# Patient Record
Sex: FEMALE | Race: WHITE
[De-identification: names, ages, dates, MRNs, and addresses within clinical notes are randomized per-mention and may not be internally consistent; named-entity substitution may affect disease eponyms.]

---

## 2017-01-10 ENCOUNTER — HOSPITAL ENCOUNTER (EMERGENCY)
Dept: HOSPITAL 17 - NEPJ | Age: 27
LOS: 1 days | Discharge: HOME | End: 2017-01-11
Payer: COMMERCIAL

## 2017-01-10 VITALS
OXYGEN SATURATION: 98 % | SYSTOLIC BLOOD PRESSURE: 156 MMHG | RESPIRATION RATE: 17 BRPM | DIASTOLIC BLOOD PRESSURE: 96 MMHG | TEMPERATURE: 98.3 F | HEART RATE: 74 BPM

## 2017-01-10 VITALS
DIASTOLIC BLOOD PRESSURE: 96 MMHG | RESPIRATION RATE: 18 BRPM | SYSTOLIC BLOOD PRESSURE: 160 MMHG | OXYGEN SATURATION: 98 % | HEART RATE: 80 BPM

## 2017-01-10 VITALS — HEIGHT: 64 IN | WEIGHT: 110.23 LBS | BODY MASS INDEX: 18.82 KG/M2

## 2017-01-10 VITALS
HEART RATE: 81 BPM | OXYGEN SATURATION: 98 % | DIASTOLIC BLOOD PRESSURE: 56 MMHG | RESPIRATION RATE: 17 BRPM | SYSTOLIC BLOOD PRESSURE: 116 MMHG

## 2017-01-10 DIAGNOSIS — F34.81: Primary | ICD-10-CM

## 2017-01-10 LAB
ALP SERPL-CCNC: 80 U/L (ref 45–117)
ALT SERPL-CCNC: 21 U/L (ref 10–53)
AMPHETAMINE, URINE: (no result)
ANION GAP SERPL CALC-SCNC: 8 MEQ/L (ref 5–15)
AST SERPL-CCNC: 12 U/L (ref 15–37)
BARBITURATES, URINE: (no result)
BASOPHILS # BLD AUTO: 0.1 TH/MM3 (ref 0–0.2)
BASOPHILS NFR BLD: 0.7 % (ref 0–2)
BILIRUB SERPL-MCNC: 0.3 MG/DL (ref 0.2–1)
BUN SERPL-MCNC: 10 MG/DL (ref 7–18)
CHLORIDE SERPL-SCNC: 106 MEQ/L (ref 98–107)
COCAINE UR-MCNC: (no result) NG/ML
EOSINOPHIL # BLD: 0.1 TH/MM3 (ref 0–0.4)
EOSINOPHIL NFR BLD: 1 % (ref 0–4)
ERYTHROCYTE [DISTWIDTH] IN BLOOD BY AUTOMATED COUNT: 12.8 % (ref 11.6–17.2)
GFR SERPLBLD BASED ON 1.73 SQ M-ARVRAT: 71 ML/MIN (ref 89–?)
HCO3 BLD-SCNC: 27 MEQ/L (ref 21–32)
HCT VFR BLD CALC: 42 % (ref 35–46)
HEMO FLAGS: (no result)
LYMPHOCYTES # BLD AUTO: 1.9 TH/MM3 (ref 1–4.8)
LYMPHOCYTES NFR BLD AUTO: 20 % (ref 9–44)
MCH RBC QN AUTO: 30.5 PG (ref 27–34)
MCHC RBC AUTO-ENTMCNC: 33.5 % (ref 32–36)
MCV RBC AUTO: 91.3 FL (ref 80–100)
MONOCYTES NFR BLD: 4.9 % (ref 0–8)
NEUTROPHILS # BLD AUTO: 6.8 TH/MM3 (ref 1.8–7.7)
NEUTROPHILS NFR BLD AUTO: 73.4 % (ref 16–70)
PLATELET # BLD: 357 TH/MM3 (ref 150–450)
POTASSIUM SERPL-SCNC: 3.7 MEQ/L (ref 3.5–5.1)
RBC # BLD AUTO: 4.6 MIL/MM3 (ref 4–5.3)
SODIUM SERPL-SCNC: 141 MEQ/L (ref 136–145)
WBC # BLD AUTO: 9.3 TH/MM3 (ref 4–11)

## 2017-01-10 PROCEDURE — 80053 COMPREHEN METABOLIC PANEL: CPT

## 2017-01-10 PROCEDURE — 80320 DRUG SCREEN QUANTALCOHOLS: CPT

## 2017-01-10 PROCEDURE — 85025 COMPLETE CBC W/AUTO DIFF WBC: CPT

## 2017-01-10 PROCEDURE — 99283 EMERGENCY DEPT VISIT LOW MDM: CPT

## 2017-01-10 PROCEDURE — 80307 DRUG TEST PRSMV CHEM ANLYZR: CPT

## 2017-01-10 NOTE — PD
HPI


Chief Complaint:  Psychiatric Symptoms


Time Seen by Provider:  18:45


Travel History


International Travel<30 days:  No


Contact w/Intl Traveler<30days:  No


Traveled to known affect area:  No





History of Present Illness


HPI


Patient is a 26-year-old female who presents emergency Department under Sweet 

act for suicidal homicidal ideations.  Patient states that she's been hearing 

voices that are telling her to kill herself in her housemates.  She states this 

makes her scared and nervous.  She wants to get help so she doesn't do these 

things.  She states that the voices also tell her to hit her head on the floor.

  She denies any physical complaints at this time.  She denies any previous 

suicide attempt, she denies any formal psychiatric diagnosis in the past.





Formerly [facility], 1928–1983


Past Medical History


Anxiety:  Yes


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


Developmental Delay:  Yes


Diminished Hearing:  No


Endocrine:  No


Genitourinary:  No


Immune Disorder:  No


Musculoskeletal:  No


Neurologic:  Yes (right hand contracture)


Reproductive:  No


Respiratory:  No


Immunizations Current:  Yes


Migraines:  No


Pancreatitis:  Yes


Schizophrenia:  Yes (HEARING HALLUCINATION SINCE 10 YEARS OLD )


Pregnant?:  Unknown





Past Surgical History


Cholecystectomy:  Yes


Thoracic Surgery:  Yes (G-TUBE INSERTION AND REMOVAL)


Other Surgery:  Yes





Social History


Alcohol Use:  No


Tobacco Use:  No


Substance Use:  No





Allergies-Medications


(Allergen,Severity, Reaction):  


Coded Allergies:  


     Seafood (Verified  Allergy, Severe, Anaphylaxis, 12/3/16)


Reported Meds & Prescriptions





Reported Meds & Active Scripts


Active


Zyprexa Zydis (Olanzapine) 20 Mg Tab 20 Mg SL HS


Lithium Carbonate 300 Mg Cap 300 Mg PO 3 HS


Zyprexa (Olanzapine) 20 Mg Tab 20 Mg PO HS


Ativan (Lorazepam) 0.5 Mg Tab 0.5 Mg PO BID


Lamictal (Lamotrigine) 200 Mg Tab 200 Mg PO BID


Levothyroxine (Levothyroxine Sodium) 88 Mcg Tab 88 Mcg PO DAILY


Poly-Iron 150 (Polysaccharide Iron Complex) 150 Mg Cap 150 Mg PO DAILY


B-12 (Cyanocobalamin) 1,000 Mcg Lozg 1,000 Mcg IM Q30 DAYS


Polyethylene Glycol 3350 (Polyethylene Glycol 3350 (Bulk) 1 Gra Gra 3,350 Meq 

PO DAILY 30 Days








Review of Systems


Except as stated in HPI:  all other systems reviewed are Neg


Neurologic:  Positive: Other (fracture right upper extremity/ hand)


Psychiatric:  Positive: Anxiety, Suicidal Ideations, Mood Disorder, Homicidal 

Ideation





Physical Exam


Narrative


GENERAL: Well-developed, well-nourished, alert  female.  Resting 

comfortably in no acute distress.


SKIN: Warm and dry.


HEAD: Atraumatic. Normocephalic. 


EYES: Pupils equal and round. No scleral icterus. No injection or drainage. 


ENT: No nasal bleeding or discharge.  Mucous membranes pink and moist.


NECK: Trachea midline. No JVD. 


CARDIOVASCULAR: Regular rate and rhythm.  No murmur appreciated.


RESPIRATORY: No accessory muscle use. Clear to auscultation. Breath sounds 

equal bilaterally. 


GASTROINTESTINAL: Abdomen soft, non-tender, nondistended. Hepatic and splenic 

margins not palpable. 


MUSCULOSKELETAL: No obvious deformities. No clubbing.  No cyanosis.  No edema. 


NEUROLOGICAL: Awake and alert. No obvious cranial nerve deficits.  Motor 

grossly within normal limits. Normal speech.  Right upper extremity 

contracture.  Slight right facial drooping


PSYCHIATRIC: Appropriate mood and affect; insight and judgment normal.





Data


Data


Last Documented VS





Vital Signs








  Date Time  Temp Pulse Resp B/P Pulse Ox O2 Delivery O2 Flow Rate FiO2


 


1/10/17 16:09 98.3 74 17 156/96 98   








Orders





 Complete Blood Count With Diff (1/10/17 16:38)


Comprehensive Metabolic Panel (1/10/17 16:38)


Drug Screen, Random Urine (1/10/17 16:38)


Alcohol (Ethanol) (1/10/17 16:38)


Psych Screen (1/10/17 16:38)


Diet Regular Basic (1/10/17 Dinner)





Labs





 Laboratory Tests








Test 1/10/17 1/10/17





 16:37 18:20


 


White Blood Count 9.3 TH/MM3 


 


Red Blood Count 4.60 MIL/MM3 


 


Hemoglobin 14.1 GM/DL 


 


Hematocrit 42.0 % 


 


Mean Corpuscular Volume 91.3 FL 


 


Mean Corpuscular Hemoglobin 30.5 PG 


 


Mean Corpuscular Hemoglobin 33.5 % 





Concent  


 


Red Cell Distribution Width 12.8 % 


 


Platelet Count 357 TH/MM3 


 


Mean Platelet Volume 7.0 FL 


 


Neutrophils (%) (Auto) 73.4 % 


 


Lymphocytes (%) (Auto) 20.0 % 


 


Monocytes (%) (Auto) 4.9 % 


 


Eosinophils (%) (Auto) 1.0 % 


 


Basophils (%) (Auto) 0.7 % 


 


Neutrophils # (Auto) 6.8 TH/MM3 


 


Lymphocytes # (Auto) 1.9 TH/MM3 


 


Monocytes # (Auto) 0.5 TH/MM3 


 


Eosinophils # (Auto) 0.1 TH/MM3 


 


Basophils # (Auto) 0.1 TH/MM3 


 


CBC Comment DIFF FINAL  


 


Differential Comment   


 


Sodium Level 141 MEQ/L 


 


Potassium Level 3.7 MEQ/L 


 


Chloride Level 106 MEQ/L 


 


Carbon Dioxide Level 27.0 MEQ/L 


 


Anion Gap 8 MEQ/L 


 


Blood Urea Nitrogen 10 MG/DL 


 


Creatinine 0.96 MG/DL 


 


Estimat Glomerular Filtration 71 ML/MIN 





Rate  


 


Random Glucose 127 MG/DL 


 


Calcium Level 9.4 MG/DL 


 


Total Bilirubin 0.3 MG/DL 


 


Aspartate Amino Transf 12 U/L 





(AST/SGOT)  


 


Alanine Aminotransferase 21 U/L 





(ALT/SGPT)  


 


Alkaline Phosphatase 80 U/L 


 


Total Protein 7.5 GM/DL 


 


Albumin 3.7 GM/DL 


 


Ethyl Alcohol Level LESS THAN 3 





 MG/DL 


 


Urine Opiates Screen  NEG 


 


Urine Barbiturates Screen  NEG 


 


Urine Amphetamines Screen  NEG 


 


Urine Benzodiazepines Screen  NEG 


 


Urine Cocaine Screen  NEG 


 


Urine Cannabinoids Screen  NEG 











MDM


Medical Decision Making


Medical Screen Exam Complete:  Yes


Emergency Medical Condition:  Yes


Interpretation(s)





Vital Signs








  Date Time  Temp Pulse Resp B/P Pulse Ox O2 Delivery O2 Flow Rate FiO2


 


1/10/17 16:09 98.3 74 17 156/96 98   








Differential Diagnosis


Mood disorder versus substance abuse versus schizophrenia versus delirium 

versus psychosis versus other


Narrative Course


Patient's 26-year-old female who presents emergency Department under Sweet act 

for suicidal and homicidal ideations.  Patient appears nervous that she is 

actually going to follow through with these thoughts.  Patient states that the 

voices in her head are telling her to do these things.  She has no other 

complaints at this time.  CBC, chemistry, tox screen are unremarkable.  Patient'

s vital signs are stable.


Patient is medically cleared for psychiatric evaluation at this time.





Diagnosis





 Primary Impression:  


 Medical clearance for psychiatric admission


 Additional Impressions:  


 Suicidal ideations


 Homicidal ideation


Condition:  Stable








Hafsa Diggs Madhav 10, 2017 19:06

## 2017-01-11 ENCOUNTER — HOSPITAL ENCOUNTER (EMERGENCY)
Dept: HOSPITAL 17 - NEPA | Age: 27
LOS: 1 days | Discharge: HOME | End: 2017-01-12
Payer: COMMERCIAL

## 2017-01-11 VITALS — HEIGHT: 61 IN | BODY MASS INDEX: 22.89 KG/M2 | WEIGHT: 121.25 LBS

## 2017-01-11 VITALS
DIASTOLIC BLOOD PRESSURE: 88 MMHG | HEART RATE: 87 BPM | OXYGEN SATURATION: 97 % | RESPIRATION RATE: 18 BRPM | SYSTOLIC BLOOD PRESSURE: 143 MMHG

## 2017-01-11 VITALS
SYSTOLIC BLOOD PRESSURE: 142 MMHG | DIASTOLIC BLOOD PRESSURE: 83 MMHG | OXYGEN SATURATION: 99 % | RESPIRATION RATE: 18 BRPM | HEART RATE: 78 BPM

## 2017-01-11 VITALS
HEART RATE: 61 BPM | RESPIRATION RATE: 17 BRPM | OXYGEN SATURATION: 97 % | SYSTOLIC BLOOD PRESSURE: 107 MMHG | DIASTOLIC BLOOD PRESSURE: 61 MMHG

## 2017-01-11 VITALS
HEART RATE: 70 BPM | RESPIRATION RATE: 16 BRPM | OXYGEN SATURATION: 100 % | SYSTOLIC BLOOD PRESSURE: 154 MMHG | DIASTOLIC BLOOD PRESSURE: 79 MMHG | TEMPERATURE: 98.2 F

## 2017-01-11 VITALS
OXYGEN SATURATION: 100 % | DIASTOLIC BLOOD PRESSURE: 65 MMHG | HEART RATE: 52 BPM | RESPIRATION RATE: 17 BRPM | SYSTOLIC BLOOD PRESSURE: 129 MMHG

## 2017-01-11 DIAGNOSIS — Z86.73: ICD-10-CM

## 2017-01-11 DIAGNOSIS — F20.9: ICD-10-CM

## 2017-01-11 DIAGNOSIS — F43.20: Primary | ICD-10-CM

## 2017-01-11 LAB
ALP SERPL-CCNC: 75 U/L (ref 45–117)
ALT SERPL-CCNC: 20 U/L (ref 10–53)
AMPHETAMINE, URINE: (no result)
ANION GAP SERPL CALC-SCNC: 8 MEQ/L (ref 5–15)
AST SERPL-CCNC: 12 U/L (ref 15–37)
BARBITURATES, URINE: (no result)
BASOPHILS # BLD AUTO: 0.1 TH/MM3 (ref 0–0.2)
BASOPHILS NFR BLD: 0.8 % (ref 0–2)
BILIRUB SERPL-MCNC: 0.3 MG/DL (ref 0.2–1)
BUN SERPL-MCNC: 9 MG/DL (ref 7–18)
CHLORIDE SERPL-SCNC: 106 MEQ/L (ref 98–107)
COCAINE UR-MCNC: (no result) NG/ML
EOSINOPHIL # BLD: 0.1 TH/MM3 (ref 0–0.4)
EOSINOPHIL NFR BLD: 1 % (ref 0–4)
ERYTHROCYTE [DISTWIDTH] IN BLOOD BY AUTOMATED COUNT: 12.6 % (ref 11.6–17.2)
GFR SERPLBLD BASED ON 1.73 SQ M-ARVRAT: 83 ML/MIN (ref 89–?)
HCO3 BLD-SCNC: 27 MEQ/L (ref 21–32)
HCT VFR BLD CALC: 40.5 % (ref 35–46)
HEMO FLAGS: (no result)
LYMPHOCYTES # BLD AUTO: 2.4 TH/MM3 (ref 1–4.8)
LYMPHOCYTES NFR BLD AUTO: 23.9 % (ref 9–44)
MCH RBC QN AUTO: 30.6 PG (ref 27–34)
MCHC RBC AUTO-ENTMCNC: 34 % (ref 32–36)
MCV RBC AUTO: 89.9 FL (ref 80–100)
MONOCYTES NFR BLD: 7 % (ref 0–8)
NEUTROPHILS # BLD AUTO: 6.8 TH/MM3 (ref 1.8–7.7)
NEUTROPHILS NFR BLD AUTO: 67.3 % (ref 16–70)
PLATELET # BLD: 395 TH/MM3 (ref 150–450)
POTASSIUM SERPL-SCNC: 3.6 MEQ/L (ref 3.5–5.1)
RBC # BLD AUTO: 4.51 MIL/MM3 (ref 4–5.3)
SODIUM SERPL-SCNC: 141 MEQ/L (ref 136–145)
WBC # BLD AUTO: 10 TH/MM3 (ref 4–11)

## 2017-01-11 PROCEDURE — 80320 DRUG SCREEN QUANTALCOHOLS: CPT

## 2017-01-11 PROCEDURE — 99283 EMERGENCY DEPT VISIT LOW MDM: CPT

## 2017-01-11 PROCEDURE — 85025 COMPLETE CBC W/AUTO DIFF WBC: CPT

## 2017-01-11 PROCEDURE — 84703 CHORIONIC GONADOTROPIN ASSAY: CPT

## 2017-01-11 PROCEDURE — 80053 COMPREHEN METABOLIC PANEL: CPT

## 2017-01-11 PROCEDURE — 80307 DRUG TEST PRSMV CHEM ANLYZR: CPT

## 2017-01-11 NOTE — PD
HPI


Chief Complaint:  psych


Time Seen by Provider:  20:45


Travel History


International Travel<30 days:  No


Contact w/Intl Traveler<30days:  No





History of Present Illness


HPI


26-year-old female with a history of schizophrenia presents to the emergency 

Department under Sweet act for suicidal and homicidal ideations.  The patient 

states that she has recently begun to hear voices in her head that tell her to 

harm herself and to harm the other people at her group home.  She denies any 

attempts to harm herself, denies any ingestion of substances.  She complains of 

mild headache.  She denies any fever, chills, nausea, vomiting, chest pain, 

shortness breath, abdominal pain, lightheadedness, dizziness.  Denies alcohol 

or drug use.  Denies pregnancy.  No other complaints.





PFSH


Past Medical History


Anxiety:  Yes


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


Developmental Delay:  Yes


Diminished Hearing:  No


Endocrine:  No


Genitourinary:  No


Immune Disorder:  No


Musculoskeletal:  No


Neurologic:  Yes (right hand contracture)


Reproductive:  No


Respiratory:  No


Immunizations Current:  Yes


Migraines:  No


Pancreatitis:  Yes


Schizophrenia:  Yes (HEARING HALLUCINATION SINCE 10 YEARS OLD )





Past Surgical History


Cholecystectomy:  Yes


Thoracic Surgery:  Yes (G-TUBE INSERTION AND REMOVAL)


Other Surgery:  Yes





Social History


Alcohol Use:  No


Tobacco Use:  No


Substance Use:  No





Allergies-Medications


(Allergen,Severity, Reaction):  


Coded Allergies:  


     Seafood (Verified  Allergy, Severe, Anaphylaxis, 12/3/16)


Reported Meds & Prescriptions





Reported Meds & Active Scripts


Active


Zyprexa Zydis (Olanzapine) 20 Mg Tab 20 Mg SL HS


Lithium Carbonate 300 Mg Cap 300 Mg PO 3 HS


Ativan (Lorazepam) 0.5 Mg Tab 0.5 Mg PO BID


Lamictal (Lamotrigine) 200 Mg Tab 200 Mg PO BID


Levothyroxine (Levothyroxine Sodium) 88 Mcg Tab 88 Mcg PO DAILY


Poly-Iron 150 (Polysaccharide Iron Complex) 150 Mg Cap 150 Mg PO DAILY


B-12 (Cyanocobalamin) 1,000 Mcg Lozg 1,000 Mcg IM Q30 DAYS


Polyethylene Glycol 3350 (Polyethylene Glycol 3350 (Bulk) 1 Gra Gra 3,350 Meq 

PO DAILY 30 Days


Reported


D 1000 (Cholecalciferol) 1,000 Unit Cap   


[place] (Levonorgestrel-Ethinyl Estradiol) 0.15-0.03-0.01 Mg Tab 1 Tab PO DAILY


Prevident 5000 Sensitive 1.1-5 % (Sodium Fluoride-Potassium Nitr) 1 Pst Pst 

Unknown Dose PO HS


Clindamycin Topical (Clindamycin Phosphate) 1% Gel 1 Applic TOPICAL BID








Review of Systems


Except as stated in HPI:  all other systems reviewed are Neg





Physical Exam


Narrative


GENERAL: Well-nourished and well-developed pleasant female patient in no acute 

distress.


SKIN: Warm and dry.


HEAD: Normocephalic and atraumatic. 


EYES: No injection, drainage, or hyphema noted.  PERRLA.  EOMI.  


ENT: No nasal drainage noted.  Oropharynx is clear.


NECK: Supple and the trachea is midline. 


CARDIOVASCULAR: Regular rate and rhythm.


RESPIRATORY: Breath sounds are equal bilaterally with no accessory muscle use, 

wheezing, rhonchi, or crackles.


GASTROINTESTINAL: Abdomen is soft, non-tender, and nondistended.  


MUSCULOSKELETAL: No obvious deformities, swelling, cyanosis, or ecchymosis is 

present throughout the upper and lower extremities.  Patient has full range of 

motion without any signs of neurovascular compromise.  


NEUROLOGICAL: Awake, alert, and oriented. Normal speech and gait. Cranial 

nerves are grossly intact.





Data


Data


Orders





 Complete Blood Count With Diff (1/11/17 20:44)


Comprehensive Metabolic Panel (1/11/17 20:44)


Drug Screen, Random Urine (1/11/17 20:44)


Ed Urine Pregnancytest Poc (1/11/17 20:44)


Alcohol (Ethanol) (1/11/17 20:44)


Psych Screen (1/11/17 20:44)








MDM


Medical Decision Making


Medical Screen Exam Complete:  Yes


Emergency Medical Condition:  Yes


Differential Diagnosis


Differential: Depression versus adjustment reaction versus anxiety versus PTSD 

versus psychosis NOS versus mood disorder NOS versus substance induced mood 

disorder versus ODD versus adjustment reaction versus schizophrenia versus 

bipolar disorder versus schizoaffective versus electrolyte abnormality versus 

dementia versus malingering.


Narrative Course


Patient presents under a Sweet act.  Physical examination and vital signs are 

essentially unremarkable.  Patient has no medical complaints to report.





Psych screen has been ordered.





If the laboratory results are unremarkable, the patient will be medically 

cleared for psychiatric evaluation and disposition.





Diagnosis





 Primary Impression:  


 Schizophrenia, paranoid type








Li Brewster Jan 11, 2017 20:47

## 2017-01-11 NOTE — PD
__________________________________________________





History of Present Illness


Chief Complaint:  Psychiatric Symptoms


Time Seen by Provider:  14:15


Travel History


International Travel<30 Days:  No


Contact w/Intl Traveler<30days:  No


Known affected area:  No





Legal Status


Legal Status:  B[place] Act


[place] Act Signed By:  Karin Riggins


History of Present Illness:


History of Present Illness





Patient is a 26-year-old female with hx of disruptive mood regulation as well 

as mood disorder  who presents emergency Department under Sweet act initiated 

by NAAM . As per the report the patient reported hearing voices that are telling 

her to kill herself. She told the  that she was banging her head 

against a dresser. Patient has had previous visits to ED for similar behaviors. 

She currently lives in a group home and is unhappy because she is going to be 

moving to another home soon. 


Patient was monitored in J pod. She did not present any behavioral concerns. 

there was no agitation and no  self harming behaviors. She did not present any 

psychosis.She is alert and oriented. Engages easily with this writer and 

frequently states " I'm not fibbing". She states that she needs " to be 

inpatient  for a while because I am under stress". She does not verbalize any 

current suicidal ideation but states " I don't know if the voices will get 

worse and I will fell suicidal when I'm in the home". Staff has contacted her 

mother who is her guardian and who is  strongly advocating for us not to 

hospitalize her as this is a behavior and attention seeking on her part.





PFSH


Past Medical History


Anxiety:  Yes


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


Developmental Delay:  Yes


Diminished Hearing:  No


Endocrine:  No


Genitourinary:  No


Immune Disorder:  No


Musculoskeletal:  No


Neurologic:  Yes (right hand contracture)


Reproductive:  No


Respiratory:  No


Immunizations Current:  Yes


Migraines:  No


Pancreatitis:  Yes


Schizophrenia:  Yes (HEARING HALLUCINATION SINCE 10 YEARS OLD )


Pregnant?:  Unknown





Past Surgical History


Cholecystectomy:  Yes


Thoracic Surgery:  Yes (G-TUBE INSERTION AND REMOVAL)


Other Surgery:  Yes





Psychiatric History


Psychiatric History


Hx Psychiatric Treatment:  


HX OF SCHIZOPHRENIA AND DYSRUPTIVE MOOD D/O. CURRENTLY BEING TREATED WITH 


PSYCHIATRIC MEDS


History of Inpatient Treatment:  Yes


Guns or firearms in home:  No





Social History


Single female. Lives in a group home. Never .


Hx Alcohol Use:  No


Hx Tobacco Use:  No


Hx Substance Use:  No


Hx of Substance Use Treatment:  No





Allergies-Medications


(Allergen,Severity, Reaction):  


Coded Allergies:  


     Seafood (Verified  Allergy, Severe, Anaphylaxis, 1/11/17)


Reported Meds & Prescriptions





Reported Meds & Active Scripts


Active


Zyprexa Zydis (Olanzapine) 20 Mg Tab 20 Mg SL HS


Lithium Carbonate 300 Mg Cap 300 Mg PO 3 HS


Ativan (Lorazepam) 0.5 Mg Tab 0.5 Mg PO BID


Lamictal (Lamotrigine) 200 Mg Tab 200 Mg PO BID


Levothyroxine (Levothyroxine Sodium) 88 Mcg Tab 88 Mcg PO DAILY


Poly-Iron 150 (Polysaccharide Iron Complex) 150 Mg Cap 150 Mg PO DAILY


B-12 (Cyanocobalamin) 1,000 Mcg Lozg 1,000 Mcg IM Q30 DAYS


Polyethylene Glycol 3350 (Polyethylene Glycol 3350 (Bulk) 1 Gra Gra 3,350 Meq 

PO DAILY 30 Days


Reported


D 1000 (Cholecalciferol) 1,000 Unit Cap   


[place] (Levonorgestrel-Ethinyl Estradiol) 0.15-0.03-0.01 Mg Tab 1 Tab PO DAILY


Prevident 5000 Sensitive 1.1-5 % (Sodium Fluoride-Potassium Nitr) 1 Pst Pst 

Unknown Dose PO HS


Clindamycin Topical (Clindamycin Phosphate) 1% Gel 1 Applic TOPICAL BID





Review of Systems


Constitutional:  DENIES: Diaphoretic episodes, Fatigue, Fever, Weight gain, 

Weight loss, Chills, Dizziness, Change in appetite, Night Sweats


Endocrine:  DENIES: Abnorml menstrual pattern, Heat/cold intolerance, Polydipsia

, Polyuria, Polyphagia


Eyes:  DENIES: Blurred vision, Diplopia, Eye inflammation, Eye pain, Vision loss

, Photosensitivity, Double Vision


Ears, nose, mouth, throat:  DENIES: Tinnitus, Hearing loss, Vertigo, Nasal 

discharge, Oral lesions, Throat pain, Hoarseness, Ear Pain, Running Nose, 

Epistaxis, Sinus Pain, Toothache, Odynophagia


Respiratory:  DENIES: Apneas, Cough, Snoring, Wheezing, Hemoptysis, Sputum 

production, Shortness of breath


Cardiovascular:  DENIES: Chest pain, Palpitations, Syncope, Dyspnea on Exertion

, PND, Lower Extremity Edema, Orthopnea, Claudication


Gastrointestinal:  DENIES: Abdominal pain, Black stools, Bloody stools, 

Constipation, Diarrhea, Nausea, Vomiting, Difficulty Swallowing, Anorexia


Genitourinary:  DENIES: Abnormal vaginal bleeding, Dysmenorrhea, Dyspareunia, 

Sexual dysfunction, Urinary frequency, Urinary incontinence, Urgency, Hematuria

, Dysuria, Nocturia, Vaginal discharge


Musculoskeletal:  COMPLAINS OF: Muscle aches


Integumentary:  DENIES: Abnormal pigmentation, Pruritus, Rash, Nail changes, 

Breast masses, Breast skin changes, Nipple discharge


Hematologic/lymphatic:  DENIES: Bruising, Lymphadenopathy


Neurologic:  COMPLAINS OF: Abnormal gait (has CP)


Psychiatric:  COMPLAINS OF: Mood changes





Exam


Alert:  Yes


[place]:  Person (ox3)


Mood:  Calm


Affect:  Euthymic


Speech:  Clear


Eye Contact:  Normal


Memory Intact:  Comment (not impaired)


Hallucinations:  Other (negative)


Delusions:  No


Suicidal:  Ideation (no active)


Homicidal:  Ideation (no active)


Insight/Judgement


poor. poor





MDM


Medical Decision Making


Medical Record Reviewed:  Yes


Assessment/Plan


26 year old female under a bA. at this time this patietn does not present any 

criteria for BA. She does not present any acute psychiatric symptomatology 

[facility]. At this time she will be discharged to group home


Follow up with outpatient psychiatrist.





Orders





 Complete Blood Count With Diff (1/10/17 16:38)


Comprehensive Metabolic Panel (1/10/17 16:38)


Drug Screen, Random Urine (1/10/17 16:38)


Alcohol (Ethanol) (1/10/17 16:38)


Psych Screen (1/10/17 16:38)


Diet Regular Basic (1/10/17 Dinner)


Diet Regular Basic (1/11/17 Breakfast)


Diet Regular Basic (1/11/17 Lunch)


Diet Regular Basic (1/11/17 Dinner)





Results





Vital Signs








  Date Time  Temp Pulse Resp B/P Pulse Ox O2 Delivery O2 Flow Rate FiO2


 


1/11/17 14:05  78 18 142/83 99 Room Air  


 


1/11/17 10:38  87 18 143/88 97 Room Air  


 


1/11/17 06:31  61 17 107/61 97   


 


1/11/17 02:37  52 17 129/65 100 Room Air  


 


1/10/17 22:46  81 17 116/56 98 Room Air  


 


1/10/17 19:31  80 18 160/96 98 Room Air  


 


1/10/17 16:09 98.3 74 17 156/96 98   











Laboratory Tests








Test 1/10/17 1/10/17





 16:37 18:20


 


White Blood Count 9.3  


 


Red Blood Count 4.60  


 


Hemoglobin 14.1  


 


Hematocrit 42.0  


 


Mean Corpuscular Volume 91.3  


 


Mean Corpuscular Hemoglobin 30.5  


 


Mean Corpuscular Hemoglobin 33.5  





Concent  


 


Red Cell Distribution Width 12.8  


 


Platelet Count 357  


 


Mean Platelet Volume 7.0  


 


Neutrophils (%) (Auto) 73.4  


 


Lymphocytes (%) (Auto) 20.0  


 


Monocytes (%) (Auto) 4.9  


 


Eosinophils (%) (Auto) 1.0  


 


Basophils (%) (Auto) 0.7  


 


Neutrophils # (Auto) 6.8  


 


Lymphocytes # (Auto) 1.9  


 


Monocytes # (Auto) 0.5  


 


Eosinophils # (Auto) 0.1  


 


Basophils # (Auto) 0.1  


 


CBC Comment DIFF FINAL  


 


Differential Comment   


 


Sodium Level 141  


 


Potassium Level 3.7  


 


Chloride Level 106  


 


Carbon Dioxide Level 27.0  


 


Anion Gap 8  


 


Blood Urea Nitrogen 10  


 


Creatinine 0.96  


 


Estimat Glomerular Filtration 71  





Rate  


 


Random Glucose 127  


 


Calcium Level 9.4  


 


Total Bilirubin 0.3  


 


Aspartate Amino Transf 12  





(AST/SGOT)  


 


Alanine Aminotransferase 21  





(ALT/SGPT)  


 


Alkaline Phosphatase 80  


 


Total Protein 7.5  


 


Albumin 3.7  


 


Ethyl Alcohol Level LESS THAN 3  


 


Urine Opiates Screen  NEG 


 


Urine Barbiturates Screen  NEG 


 


Urine Amphetamines Screen  NEG 


 


Urine Benzodiazepines Screen  NEG 


 


Urine Cocaine Screen  NEG 


 


Urine Cannabinoids Screen  NEG 











Diagnosis





 Primary Impression:  


 Disruptive mood dysregulation disorder


 Additional Impression:  


 Suicidal ideations


 Ruled Out:  Homicidal ideation


Psychiatrically Cleared:  Yes


Disposition:  01 DISCHARGE HOME


Condition:  Stable





Problem Qualifiers








Margot Hurley Jan 11, 2017 14:39

## 2017-01-12 VITALS
DIASTOLIC BLOOD PRESSURE: 100 MMHG | OXYGEN SATURATION: 98 % | HEART RATE: 77 BPM | SYSTOLIC BLOOD PRESSURE: 173 MMHG | TEMPERATURE: 98.4 F | RESPIRATION RATE: 20 BRPM

## 2017-01-12 VITALS
SYSTOLIC BLOOD PRESSURE: 151 MMHG | HEART RATE: 70 BPM | DIASTOLIC BLOOD PRESSURE: 99 MMHG | OXYGEN SATURATION: 98 % | RESPIRATION RATE: 18 BRPM

## 2017-01-12 NOTE — PD.CONS
Provisional Diagnosis


Admission Date


1/11/2017


Axis I.


1.  Adjustment disorder, unspecified


2.  History of schizophrenia, presently stable


Axis II.


Deferred


Axis V.


GAF is 45 presently





History of Present Illness


Service


Psychiatry


Consult Requested By


Emergency department


Reason for Consult


Sweet act


Primary Care Physician


Remedios Sierra MD


HPI


Ms. Ashton is a 26-year-old  female with a history of adjustment 

disorder and schizophrenia who presents under a Baker act alleging that the 

patient was hiding in the closet and was endorsing auditory hallucinations.  

The patient was evaluated yesterday in the emergency department by nurse 

practitioner Xavi and returned to her group home at that time.  Patient is 

well known to me; she has a history endorsing command auditory hallucinations 

as an attention seeking ploy and was last hospitalized, in part under my care, 

in September of last year.  Electronic medical record reviewed.





Patient seen and examined.  Chart reviewed.  Case discussed with nurse in the J-

pod.  Per nursing staff, no evidence of any suicidality or homicidality while 

under observation in the J-pod.  She has not appeared to act upon any auditory 

hallucinations while in the J-pod.  On my examination today, the patient 

appears euthymic.  She does not appear internally stimulated.  She says that 

she is experiencing "voices, adult voices."  She insinuates, although she does 

not explicitly say, that these are command in nature.  She denies any desire to 

injure herself or others.  She denies any suicidal or homicidal planning.  No 

issues with mood.  Patient appears generally to be at her psychiatric baseline.

  She does admit that there has been some stress at her group home and also her 

outpatient psychiatrist, Dr. Cabral, has recently closed his outpatient 

practice.  I note his last visit with patient was about a week ago.





Past psychiatric, family, chemical dependency and social history are unchanged 

from previous encounters.





I have obtained collateral from patient's mother Ms. Cordero.  Mother 

articulates that patient's behaviors that led to her being Sweet acted are, in 

mother's opinion, part of patient's typical attention seeking ploys.  She notes

, in particular, that they are "same old, same old."  Mother notes that she is 

working with patient's current group home to have the patient moved to a higher 

level group home where they can more intensively apply patient's behavior plan 

to cope with her attention seeking behaviors.  It is mother's strong preference 

that the patient be returned to her group home today so as not to reinforce 

patient's attention seeking behaviors.





Review of Systems


ROS Limitations:  Poor Historian


Other


No reported physical complaints.





Past Family Social History


Coded Allergies:  


     Seafood (Verified  Allergy, Severe, Anaphylaxis, 1/11/17)


Past Medical History


See electronic medical record


Active Scripts


Olanzapine Odt (Zyprexa Zydis)20 Mg Tab20 Mg SL HS  #30 TAB  Ref 2


   Prov:Josh Cabral MD         1/5/17


Lithium Carbonate 300 Mg Odz123 Mg PO 3 hs  #90 CAP  Ref 2


   Prov:Josh Cabral MD         1/4/17


Lorazepam (Ativan)0.5 Mg Tab0.5 Mg PO BID  #60 TAB  Ref 2


   Prov:Josh Cabral MD         1/4/17


Lamotrigine (Lamictal)200 Mg Ylp522 Mg PO BID  #60 TAB  Ref 2


   Prov:Josh Cabral MD         1/4/17


Levothyroxine 88 Mcg Tab88 Mcg PO DAILY  #30 TAB  Ref 2


   Prov:Josh Cabral MD         1/4/17


Polysaccharide Iron Complex (Poly-Iron 150)150 Mg [id number] Mg PO DAILY  #30 CAP  

Ref 0


   Prov:Josh Cabral MD         11/8/16


Cyanocobalamin (B-12)1,000 Mcg Lozg1,000 Mcg IM q30 days  #1 BOTTLE  Ref 0


   Prov:Josh Cabral MD         11/8/16


Polyethylene Glycol 3350 (Bulk (Polyethylene Glycol 3350)1 Gra Gra3,350 Meq PO 

DAILY  30 Days


   Prov:Josh Cabral MD         11/8/16


Reported Medications


Cholecalciferol (D 1000)1,000 Unit Cap 


   1/10/17


Levonorgestrel-Ethinyl Estradiol (Daysee)0.15-0.03-0.01 Mg Tab1 Tab PO DAILY  #

91 TAB  Ref 0


   1/10/17


Sodium Fluoride-Potassium Nitr (Prevident 5000 Sensitive 1.1-5 %)1 Pst 

PstUnknown Dose PO HS 


   1/10/17


Clindamycin Topical 1% Gel1 Applic TOPICAL BID  #30 GM  Ref 0


   1/10/17


Discontinued Scripts


Olanzapine (Zyprexa)20 Mg Tab20 Mg PO HS  #30 TAB  Ref 2


   Prov:Josh Cabral MD         1/4/17


Family History


Unchanged from previous assessments


Social History


Unchanged from previous assessments


Patient's Strengths (min. 2)


Supportive mother.  Structured living environment.





Physical Exam


Physical examination completed by ED provider.  On my examination today, 

patient appears to be in no acute physical distress.  No new motoric 

abnormalities noted.  Labs and vital signs reviewed.


Vital Signs





 Vital Signs








  Date Time  Temp Pulse Resp B/P Pulse Ox O2 Delivery O2 Flow Rate FiO2


 


1/12/17 08:25 98.4 77 20 173/100 98 Room Air  








Lab Results











Item Value  Date Time


 


White Blood Count 10.0 TH/MM3 1/11/17 2050


 


Hemoglobin 13.8 GM/DL 1/11/17 2050


 


Platelet Count 395 TH/MM3 1/11/17 2050


 


Sodium Level 141 MEQ/L 1/11/17 2050


 


Potassium Level 3.6 MEQ/L 1/11/17 2050


 


Chloride Level 106 MEQ/L 1/11/17 2050


 


Carbon Dioxide Level 27.0 MEQ/L 1/11/17 2050


 


Blood Urea Nitrogen 9 MG/DL 1/11/17 2050


 


Creatinine 0.84 MG/DL 1/11/17 2050


 


Aspartate Amino Transf (AST/SGOT) 12 U/L L 1/11/17 2050


 


Alanine Aminotransferase (ALT/SGPT) 20 U/L 1/11/17 2050


 


Alkaline Phosphatase 75 U/L 1/11/17 2050








Urine toxicology negative.





Mental Status Examination


Patient is in hospital attire.  She is fairly well groomed and maintaining 

basic hygiene.  She is awake and alert and oriented to person and hospital.  No 

new motoric abnormalities noted.  Speech is within normal limits for rate, tone 

and volume.  Mood is fair and affect is childlike.  Thought process linear.  No 

loosening of associations.  No evident delusions.  Patient describes 

hallucinatory phenomena as detailed above but does not appear at all internally 

stimulated.  The patient denies any suicidal or homicidal ideation, intent or 

plan on direct questioning.  Insight and judgment are poor, chronically so.


Previous Suicide Attempts:  No


Previous Homicide Attempts:  No





Assessment & Plan


Problem List:  


(1) Adjustment disorder


ICD Code:  F43.20


(2) History of schizophrenia


ICD Code:  Z86.59


Assessment & Plan


This is a 26-year-old  female with psychiatric history as detailed 

above who presents under a Sweet act from her group home.  This is the patient'

s second presentation and has many days.  The patient is well-known to me and 

has a history of reporting command auditory hallucinations as an attention 

seeking ploy.  I suspect her reports of these today are more of the same, and I 

have captured this behavior with the adjustment disorder diagnosis.  There is 

no evidence of any internal stimulation, and the patient denies any desire to 

injure herself or anyone else, nor does it seem like these purported voices are 

particularly influential as she has managed to refrain from injuring herself or 

anyone else during her period of observation in the J-pod.  I concur with the 

patient's mother that admitting the patient to the inpatient psychiatric unit 

at this time would simply reinforce the undesirable behavior and, as such, be 

actively counterproductive to patient's treatment plan and case.  I will 

therefore lift the Baker Act and discharge her back to her group home.  I see 

that Dr. Cabral has made provisions for patient's subsequent outpatient care, 

and the patient should continue her current medications and follow up 

outpatient as arranged.  Patient to return to psychiatric emergency room for 

any concerning psychiatric symptoms.  Case d/w RN in J-Pod.





Thank you very much for this consultation.





Problem Qualifiers





(1) Adjustment disorder:  


Qualified Code:  F43.20 - Adjustment disorder, unspecified type





Manpreet Price MD Jan 12, 2017 13:23

## 2017-01-13 ENCOUNTER — HOSPITAL ENCOUNTER (INPATIENT)
Dept: HOSPITAL 17 - NEPJ | Age: 27
LOS: 7 days | Discharge: HOME | DRG: 882 | End: 2017-01-20
Attending: PSYCHIATRY & NEUROLOGY | Admitting: PSYCHIATRY & NEUROLOGY
Payer: COMMERCIAL

## 2017-01-13 VITALS
TEMPERATURE: 98.3 F | OXYGEN SATURATION: 99 % | RESPIRATION RATE: 17 BRPM | HEART RATE: 61 BPM | SYSTOLIC BLOOD PRESSURE: 123 MMHG | DIASTOLIC BLOOD PRESSURE: 63 MMHG

## 2017-01-13 VITALS — WEIGHT: 129.85 LBS | BODY MASS INDEX: 20.38 KG/M2 | HEIGHT: 67 IN

## 2017-01-13 VITALS
DIASTOLIC BLOOD PRESSURE: 92 MMHG | SYSTOLIC BLOOD PRESSURE: 145 MMHG | RESPIRATION RATE: 18 BRPM | HEART RATE: 87 BPM | OXYGEN SATURATION: 98 % | TEMPERATURE: 92.1 F

## 2017-01-13 VITALS
SYSTOLIC BLOOD PRESSURE: 133 MMHG | DIASTOLIC BLOOD PRESSURE: 86 MMHG | TEMPERATURE: 97.8 F | HEART RATE: 57 BPM | RESPIRATION RATE: 16 BRPM

## 2017-01-13 VITALS
OXYGEN SATURATION: 96 % | TEMPERATURE: 98 F | SYSTOLIC BLOOD PRESSURE: 143 MMHG | DIASTOLIC BLOOD PRESSURE: 91 MMHG | RESPIRATION RATE: 20 BRPM | HEART RATE: 97 BPM

## 2017-01-13 DIAGNOSIS — F43.20: Primary | ICD-10-CM

## 2017-01-13 DIAGNOSIS — R45.851: ICD-10-CM

## 2017-01-13 DIAGNOSIS — F20.0: ICD-10-CM

## 2017-01-13 DIAGNOSIS — R45.850: ICD-10-CM

## 2017-01-13 DIAGNOSIS — Z86.73: ICD-10-CM

## 2017-01-13 PROCEDURE — 80061 LIPID PANEL: CPT

## 2017-01-13 PROCEDURE — 99284 EMERGENCY DEPT VISIT MOD MDM: CPT

## 2017-01-13 PROCEDURE — 80178 ASSAY OF LITHIUM: CPT

## 2017-01-13 PROCEDURE — 83036 HEMOGLOBIN GLYCOSYLATED A1C: CPT

## 2017-01-13 RX ADMIN — CLINDAMYCIN PHOSPHATE SCH APPLIC: 10 SOLUTION TOPICAL at 21:00

## 2017-01-13 RX ADMIN — LITHIUM CARBONATE SCH MG: 300 CAPSULE, GELATIN COATED ORAL at 20:59

## 2017-01-13 RX ADMIN — ACETAMINOPHEN PRN MG: 325 TABLET ORAL at 18:03

## 2017-01-13 RX ADMIN — OLANZAPINE SCH MG: 20 TABLET, ORALLY DISINTEGRATING ORAL at 20:58

## 2017-01-13 NOTE — HHI.HP
Provisional Diagnosis


Admission Date


1/13/2017


Axis I.


1.  Adjustment disorder, unspecified


2.  History of schizophrenia, presently stable


Axis II.


Deferred


Axis V.


GAF is 40 presently





                               Certification of Person's Competence 


                           To Provide Express and Informed Consent





I have personally examined Joycelyn Pond , a person being served at [facility] on, Jan 13, 2017 15:01.


Express and informed consent means consent voluntarily given in writing, by a 

competent person, after sufficient explanation and disclosure of the subject 

matter involved to enable the person to make a knowing and willful decision 

without any element of force, fraud, deceit, duress, or other form of 

constraint or coercion.





This person is 18 years of age or older, is not now known to be incompetent to 

consent to treatment with a guardian advocate, and does not have a health care 

surrogate or proxy currently making medical treatment decisions.  I have found 

this person to be one of the following:





[] Competent to provide express and informed consent, as defined above, for 

voluntary admission to this facility and is competent to provide express and 

informed consent for treatment.  He/she has the consistent capacity to make 

well reasoned, willful, and knowing decisions concerning his or her medical or 

mental health treatment.  The person fully and consistently understands the 

purpose of the admission for examination/placement and is fully capable of 

personally exercising all rights assured [place]





[x] Incompetent to provide express and informed consent to voluntary admission, 

and this is incompetent to provide express and informed consent to treatment.  

The person must be transferred to involuntary status and a petition for a 

guardian advocate filed with the Circuit Court.





[] Refusing to provide express and informed consent to voluntary admission but 

is competent to provide express and informed consent for treatment.  The person 

must be discharged or transferred to involuntary status.





Form shall be completed within 24 hours of a person's arrival at the receiving 

facility and filed in the clinical record of each person:


1. Admitted on a voluntary basis


2. Permitted to provide express and informed consent to his/her own treatment


3. Allowed to transfer from involuntary to voluntary status


4. Prior to permitting a person to consent to his or her own treatment after 

having been previously found incompetent to consent to treatment.





History of Present Illness


Capacity:  Lacks Capacity


HPI


Ms. Pond is a 26-year-old  female with a history of adjustment 

disorder and schizophrenia who presents under a Sweet act for the third time in 

4 days alleging that she is hearing voices telling her to kill her roommate's 

at the group home.  Patient also allegedly self injured by banging her head 

against the wall.  I saw the patient in consultation yesterday and lifted her 

Sweet act because she has a history of voicing command auditory hallucinations 

and even engaging in self-injurious behavior as an attention seeking ploy and 

in order to get admitted to the inpatient psychiatric unit..  Additionally, 

patient's mother and guardian had requested that the patient not be admitted as 

admission reinforces this attention seeking behavior.  Reviewing the electronic 

medical record, she was admitted most recently, and part under my care, in 

September of last year.





Patient seen and examined.  Chart reviewed.  Case discussed with nurse in the J-

pod.  On my examination today, the patient says "Hey doc, same reason.  I don't 

think going home is the best idea.  I tried to give the medications that chance 

to work, but nothing changed.  I'm telling the truth doc.  Can we put me on the 

unit with all the cameras?"  Patient is hoping to go to the high acuity 2700 

unit, I suspect because it affords greater attention.  She insists she is still 

experiencing CAH, although she does not appear internally stimulated.  She does 

not describe any current SI or HI but insists that she cannot contract for 

safety in the community.  The remainder of the psychiatric ROS is negative.





Past psychiatric, family, chemical dependency, social history have been 

obtained repeatedly during previous encounters and are unchanged today.





I did speak with patient's mother, Ms. Cordero.  Given the apparent 

persistence of patient's behaviors, she agrees that hospitalization at this 

juncture is likely necessary.  She asks that we take steps to minimize 

attention paid to patient's behaviors, though, to avoid exacerbating the 

problem.





Review of Systems


ROS Limitations:  Poor Historian


Other


No physical complaints today





Past Psych History


Psychological trauma history


No reported trauma history


Violence risk - others (6 mos)


Indeterminate


Violence risk - self (6 mos)


Indeterminate





Substance Abuse History


Drugs/Alcohol past 12 months


No reported drug or alcohol use





Past Family Social History


Coded Allergies:  


     Seafood (Verified  Allergy, Severe, Anaphylaxis, 1/11/17)


Past Medical History


See electronic medical record


Active Scripts


Olanzapine Odt (Zyprexa Zydis)20 Mg Tab20 Mg SL HS  #30 TAB  Ref 2


   Prov:Josh Cabral MD         1/5/17


Lithium Carbonate 300 Mg Huo492 Mg PO 3 hs  #90 CAP  Ref 2


   Prov:Josh Cabral MD         1/4/17


Lorazepam (Ativan)0.5 Mg Tab0.5 Mg PO BID  #60 TAB  Ref 2


   Prov:Josh Cabral MD         1/4/17


Lamotrigine (Lamictal)200 Mg Tgt895 Mg PO BID  #60 TAB  Ref 2


   Prov:Josh Cabral MD         1/4/17


Levothyroxine 88 Mcg Tab88 Mcg PO DAILY  #30 TAB  Ref 2


   Prov:Josh Cabral MD         1/4/17


Polysaccharide Iron Complex (Poly-Iron 150)150 Mg Pru289 Mg PO DAILY  #30 CAP  

Ref 0


   Prov:Josh Cabral MD         11/8/16


Cyanocobalamin (B-12)1,000 Mcg Lozg1,000 Mcg IM q30 days  #1 BOTTLE  Ref 0


   Prov:Josh Cabral MD         11/8/16


Polyethylene Glycol 3350 (Bulk (Polyethylene Glycol 3350)1 Gra Gra3,350 Meq PO 

DAILY  30 Days


   Prov:Josh Cabral MD         11/8/16


Reported Medications


Cholecalciferol (D 1000)1,000 Unit Cap 


   1/10/17


Levonorgestrel-Ethinyl Estradiol (Daysee)0.15-0.03-0.01 Mg Tab1 Tab PO DAILY  #

91 TAB  Ref 0


   1/10/17


Sodium Fluoride-Potassium Nitr (Prevident 5000 Sensitive 1.1-5 %)1 Pst 

PstUnknown Dose PO HS 


   1/10/17


Clindamycin Topical 1% Gel1 Applic TOPICAL BID  #30 GM  Ref 0


   1/10/17


Discontinued Scripts


Olanzapine (Zyprexa)20 Mg Tab20 Mg PO HS  #30 TAB  Ref 2


   Prov:Josh Cabral MD         1/4/17


Family History


See above


Social History


Patient is group home resident.  Mother is supportive.


Patient's Strengths (min. 2)


In good physical health.  Verbally fluent.





Physical Exam


Physical examination completed by ED provider.  On my examination today, 

patient is in no distress.  No abnormal motor movements noted.  Labs and vital 

signs reviewed.


Vital Signs





 Vital Signs








  Date Time  Temp Pulse Resp B/P Pulse Ox O2 Delivery O2 Flow Rate FiO2


 


1/13/17 14:29 98.0 97 20 143/91 96 Room Air  








Lab Results











Item Value  Date Time


 


White Blood Count 10.0 TH/MM3 1/11/17 2050


 


Hemoglobin 13.8 GM/DL 1/11/17 2050


 


Platelet Count 395 TH/MM3 1/11/17 2050


 


Sodium Level 141 MEQ/L 1/11/17 2050


 


Potassium Level 3.6 MEQ/L 1/11/17 2050


 


Chloride Level 106 MEQ/L 1/11/17 2050


 


Blood Urea Nitrogen 9 MG/DL 1/11/17 2050


 


Creatinine 0.84 MG/DL 1/11/17 2050


 


Aspartate Amino Transf (AST/SGOT) 12 U/L L 1/11/17 2050


 


Alanine Aminotransferase (ALT/SGPT) 20 U/L 1/11/17 2050


 


Alkaline Phosphatase 75 U/L 1/11/17 2050


 


Ethyl Alcohol Level LESS THAN 3 MG/DL 1/11/17 2050








ED pointed care pregnancy test was negative on the 11th





Mental Status Examination


Patient is casually dressed.  She is fairly well groomed.  She is awake and 

alert and oriented to person and hospital.  No abnormal motor movements noted.  

Speech is within normal limits for rate, tone and volume.  Language and fund of 

knowledge seem average for age.  Mood is fair and affect is childlike.  Thought 

process linear.  No loosening of associations.  No evident delusions.  Reports 

command auditory hallucinations as noted above.  Patient alleged to have 

thoughts of hurting self and others in response to command auditory 

hallucinations.  She does not currently have any SI or HI but cannot contract 

for safety on an outpatient basis.  Insight and judgment are poor.


Previous Suicide Attempts:  No


Previous Homicide Attempts:  No





Assessment & Plan


Problem List:  


(1) Adjustment disorder


ICD Code:  F43.20


(2) History of schizophrenia


ICD Code:  Z86.59


Assessment & Plan


This is a 26-year-old  female with psychiatric history as detailed 

above who presents for the third time in 4 days under a Baker act alleging that 

she is experiencing command auditory hallucinations.  She is also upped the 

ante somewhat by engaging in some self-injurious behavior.  She engages in 

these behaviors as an attention seeking ploy, and there is a behavioral 

management plan in place on an outpatient basis, although this is apparently 

presently inadequate to manage her problematic behaviors.  Consequently, and 

given the persistence of her behaviors, we will admit the patient to the 

inpatient psychiatric unit.





--Admit inpatient


--Involuntary status.  I've completed first opinion.  Consult for second 

opinion.  Request healthcare surrogate and guardian advocate.


--Raymond to minimize reward for attention seeking behaviors, e.g. no transfer 

to 2700, no 1:1 unless absolutely necessary for safety.


--Continue lithium, Zyprexa and Lamictal


--Ativan as needed for anxiety, Benadryl as needed for sleep, Cogentin as 

needed for EPS


--Physical therapy consult.  Falls precautions.


--Vitals every shift


--Counselor to see


--Disposition planning


--Estimated length of stay: 5-7 days


Discharge Planning


Pending outcome of observation


Request HC Surrog/Guard Advoc?:  Yes








Manpreet Price MD Jan 13, 2017 15:01

## 2017-01-13 NOTE — PD
HPI


Chief Complaint:  Psychiatric Symptoms


Time Seen by Provider:  15:16


Travel History


International Travel<30 days:  No


Contact w/Intl Traveler<30days:  No


Traveled to known affect area:  No





History of Present Illness


HPI


26-year-old female presents to the emergency Department under Baker act by 

local police for suicidal/homicidal ideations.  Patient states she's been 

hearing voices in her head that she was 10 years old.  She states that since she 

became an adult, the voices have also become adult voices.  They tell her to 

kill herself and to kill the people in her group home.  The patient was seen 

here 2 days ago in the emergency department for the same issue.  Labs were 

completed at that time.  Patient denies any medical complaints at this time.  

Patient denies any harm to herself or anybody else.





PFSH


Past Medical History


Anxiety:  Yes


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


Developmental Delay:  Yes


Diminished Hearing:  No


Endocrine:  No


Genitourinary:  No


Immune Disorder:  No


Musculoskeletal:  No


Neurologic:  Yes (right hand contracture)


Reproductive:  No


Respiratory:  No


Immunizations Current:  Yes


Migraines:  No


Pancreatitis:  Yes


Schizophrenia:  Yes (HEARING HALLUCINATION SINCE 10 YEARS OLD )





Past Surgical History


Cholecystectomy:  Yes


Thoracic Surgery:  Yes (G-TUBE INSERTION AND REMOVAL)


Other Surgery:  Yes





Social History


Alcohol Use:  No


Tobacco Use:  No


Substance Use:  No





Allergies-Medications


(Allergen,Severity, Reaction):  


Coded Allergies:  


     Seafood (Verified  Allergy, Severe, Anaphylaxis, 1/11/17)


Reported Meds & Prescriptions





Reported Meds & Active Scripts


Active


Zyprexa Zydis (Olanzapine) 20 Mg Tab 20 Mg SL HS


Lithium Carbonate 300 Mg Cap 300 Mg PO 3 HS


Ativan (Lorazepam) 0.5 Mg Tab 0.5 Mg PO BID


Lamictal (Lamotrigine) 200 Mg Tab 200 Mg PO BID


Levothyroxine (Levothyroxine Sodium) 88 Mcg Tab 88 Mcg PO DAILY


Poly-Iron 150 (Polysaccharide Iron Complex) 150 Mg Cap 150 Mg PO DAILY


B-12 (Cyanocobalamin) 1,000 Mcg Lozg 1,000 Mcg IM Q30 DAYS


Polyethylene Glycol 3350 (Polyethylene Glycol 3350 (Bulk) 1 Gra Gra 3,350 Meq 

PO DAILY 30 Days


Reported


D 1000 (Cholecalciferol) 1,000 Unit Cap   


Daysee (Levonorgestrel-Ethinyl Estradiol) 0.15-0.03-0.01 Mg Tab 1 Tab PO DAILY


Prevident 5000 Sensitive 1.1-5 % (Sodium Fluoride-Potassium Nitr) 1 Pst Pst 

Unknown Dose PO HS


Clindamycin Topical (Clindamycin Phosphate) 1% Gel 1 Applic TOPICAL BID








Review of Systems


Except as stated in HPI:  all other systems reviewed are Neg





Physical Exam


Narrative


GENERAL: Well-developed well-nourished female patient ambulatory.  Afebrile. 


SKIN: Warm and dry.


HEAD: Normocephalic.  Atraumatic.


EYES: No scleral icterus. No injection or drainage. 


NECK: Supple, trachea midline. No JVD or lymphadenopathy.


CARDIOVASCULAR: Regular rate and rhythm without murmurs, gallops, or rubs. 


RESPIRATORY: Breath sounds equal bilaterally. No accessory muscle use.  Lungs 

sounds clear to auscultation.


GASTROINTESTINAL: Abdomen soft, non-tender, nondistended. 


MUSCULOSKELETAL: No cyanosis, or edema. 


PSYCHIATRIC: No delusional thought processes. No hallucinations.





Data


Data


Last Documented VS





Vital Signs








  Date Time  Temp Pulse Resp B/P Pulse Ox O2 Delivery O2 Flow Rate FiO2


 


1/13/17 14:29 98.0 97 20 143/91 96 Room Air  








Orders





 Psych Screen (1/13/17 14:45)


Lamotrigine (Lamictal) (1/13/17 21:00)


Levothyroxine (Synthroid) (1/14/17 09:00)


Lithium Carbonate (Lithium Carbonate) (1/13/17 21:00)


Lorazepam (Ativan) (1/13/17 21:00)


Olanzapine  Odt (Zyprexa Zydis  Odt) (1/13/17 21:00)


Polyethylene Glycol (Miralax) (1/14/17 09:00)


Polysaccharide Iron Complex (Nu-Iron) (1/14/17 09:00)


(Nf) Clindamycin Topical (1/13/17 21:00)


(Nf) Cyanocobalamin (B-12) (1/13/17 15:00)


(Nf) Levonorgestrel-Ethinyl Estradiol (D (1/14/17 09:00)


Admit To Inpatient Psych (1/13/17 )


Vital Signs (Adult) VINCENZO.Q12H.E (1/13/17 14:57)


Activity Oob Ad Catalina (1/13/17 14:57)


Level Of Observation (Psych) (1/13/17 14:57)


Aims-Abnormal Invol Move Scale ONCE (1/13/17 14:57)


Diet Regular Basic (1/13/17 Dinner)


Lorazepam (Ativan) (1/13/17 15:00)


Lorazepam Inj (Ativan Inj) (1/13/17 15:00)


Diphenhydramine (Benadryl) (1/13/17 15:00)


Acetaminophen (Tylenol) (1/13/17 15:00)


Magnesium Hydroxide Liq (Milk Of Magnesi (1/13/17 15:00)


Al-Mag Hy-Si 40-40-4 Mg/Ml Liq (Mag-Al P (1/13/17 15:00)


Nicotine 21 Mg Patch.24 Hr (Habitrol 21 (1/14/17 09:00)


Benztropine (Cogentin) (1/13/17 15:00)


Benztropine Inj (Cogentin Inj) (1/13/17 15:00)


Lipid Profile (1/14/17 06:00)


Hemoglobin (Hgb) A1c (1/14/17 06:00)


Consult Psychiatry (1/13/17 )


Remove Old Patch (1/14/17 09:00)








MDM


Medical Decision Making


Medical Screen Exam Complete:  Yes


Emergency Medical Condition:  Yes


Medical Record Reviewed:  Yes


Differential Diagnosis


Schizophrenia versus bipolar versus depression versus anxiety


Narrative Course


26-year-old female presents to the emergency department under Sweet act for 

hearing voices and suicidal/homicidal ideations.  Patient had lab work done 2 

days ago in the emergency department.  At that time, CBC was unremarkable.  CMP 

showed no acute abnormalities.  Urine drug screen was negative.  Alcohol level 

is less than 3.  Patient has no medical complaints at this time.  Patient is 

medically cleared for psychiatric screening and disposition.





Mental health screening discussed with the patient. Psychiatric screen ordered.





Diagnosis





 Primary Impression:  


 Schizophrenia, paranoid type





***Additional Instructions:


Patient is medically cleared for psychiatric screening and disposition.


Condition:  Stable








Freida Lux MAYDA Jan 13, 2017 15:19

## 2017-01-14 VITALS
SYSTOLIC BLOOD PRESSURE: 118 MMHG | RESPIRATION RATE: 16 BRPM | HEART RATE: 78 BPM | DIASTOLIC BLOOD PRESSURE: 67 MMHG | TEMPERATURE: 97.6 F

## 2017-01-14 VITALS
SYSTOLIC BLOOD PRESSURE: 135 MMHG | RESPIRATION RATE: 16 BRPM | HEART RATE: 85 BPM | DIASTOLIC BLOOD PRESSURE: 90 MMHG | OXYGEN SATURATION: 100 %

## 2017-01-14 LAB
HDLC SERPL-MCNC: 39.1 MG/DL (ref 40–60)
HEMOGLOBIN A1A: 1 %
HEMOGLOBIN A1B: 0.7 %
HEMOGLOBIN AO: 87.5 %
HEMOGLOBIN LA1C: 1.7 %
HEMOGLOBIN P3: 3.1 %
HGB F MFR BLD: 1.2 %
LDLC SERPL-MCNC: 103 MG/DL (ref 0–99)

## 2017-01-14 RX ADMIN — Medication SCH MG: at 08:50

## 2017-01-14 RX ADMIN — CLINDAMYCIN PHOSPHATE SCH APPLIC: 10 SOLUTION TOPICAL at 09:00

## 2017-01-14 RX ADMIN — LITHIUM CARBONATE SCH MG: 300 CAPSULE, GELATIN COATED ORAL at 22:27

## 2017-01-14 RX ADMIN — ACETAMINOPHEN PRN MG: 325 TABLET ORAL at 22:29

## 2017-01-14 RX ADMIN — CLINDAMYCIN PHOSPHATE SCH APPLIC: 10 SOLUTION TOPICAL at 21:00

## 2017-01-14 RX ADMIN — POLYETHYLENE GLYCOL 3350 SCH GM: 17 POWDER, FOR SOLUTION ORAL at 08:50

## 2017-01-14 RX ADMIN — LEVOTHYROXINE SODIUM SCH MCG: 88 TABLET ORAL at 08:50

## 2017-01-14 RX ADMIN — NICOTINE SCH PATCH: 21 PATCH, EXTENDED RELEASE TOPICAL at 09:00

## 2017-01-14 RX ADMIN — ACETAMINOPHEN PRN MG: 325 TABLET ORAL at 14:00

## 2017-01-14 RX ADMIN — OLANZAPINE SCH MG: 20 TABLET, ORALLY DISINTEGRATING ORAL at 21:00

## 2017-01-14 NOTE — HHI.PYPN
Subjective


Remarks


Patient was seen and case discussed with nursing.  This is a request for second 

opinion from Dr. Price.  Today patient is perseverative on getting a one-to-

one.  Admission note was reviewed and the admitting physician believes that 

patient has attention seeking behavior.  Patient says she has intent the 

banging her head and auditory hallucinations telling her to hurt herself and 

others.  Her affect however is bright and cheerful and says that she wants " a 

nathaly."  She has not been being her head.  Tolerating the medications well and 

has been pleasant towards everyone around her.  Denies suicidal/homicidal 

ideations thought content or plan





Objective


Alert:  Yes


[place]:  Person, Place, Date, Situation


Mood:  Oppositional


Affect:  Euthymic


Memory Intact:  Immediate


Hallucinations:  Auditory (to herself and others)


Delusions:  No


Delusion Type:  Other


Suicidal:  Ideation (denies)


Homicidal:  Ideation (denies)


Insight/Judgement


Poor


Labs











Test 1/14/17





 08:29


 


Hemoglobin A1c 4.4 %


 


Triglycerides Level 134 MG/DL


 


Cholesterol Level 169 MG/DL


 


LDL Cholesterol 103 MG/DL


 


HDL Cholesterol 39.1 MG/DL


 


Cholesterol/HDL Ratio 4.32 RATIO








Vitals/IOs





 Vital Signs








  Date Time  Temp Pulse Resp B/P Pulse Ox O2 Delivery O2 Flow Rate FiO2


 


1/14/17 05:46 97.6 78 16 118/67    


 


1/13/17 20:24     99   


 


1/13/17 15:15      Room Air  











Assessment & Plan


Problem List:  


(1) Adjustment disorder


ICD Code:  F43.20


(2) History of schizophrenia


ICD Code:  Z86.59


Assessment & Plan


No indication for one-to-one at this time given patient's attentive seeking 

behavior and history of such behavior.  We will transfer her to see C unit as a 

precaution.  Criteria to continue petition include psychosis and unstable 

behavior


Justification for Cont. Inpt.


Patient would decompensate in a less restrictive setting


Request HC Surrog/Guard Advoc?:  Yes








Chris Granger DO Jan 14, 2017 14:54

## 2017-01-15 VITALS
DIASTOLIC BLOOD PRESSURE: 84 MMHG | HEART RATE: 94 BPM | SYSTOLIC BLOOD PRESSURE: 132 MMHG | OXYGEN SATURATION: 98 % | RESPIRATION RATE: 16 BRPM

## 2017-01-15 VITALS
DIASTOLIC BLOOD PRESSURE: 70 MMHG | HEART RATE: 83 BPM | SYSTOLIC BLOOD PRESSURE: 121 MMHG | RESPIRATION RATE: 16 BRPM | OXYGEN SATURATION: 97 %

## 2017-01-15 RX ADMIN — LEVOTHYROXINE SODIUM SCH MCG: 88 TABLET ORAL at 09:07

## 2017-01-15 RX ADMIN — Medication SCH MG: at 09:07

## 2017-01-15 RX ADMIN — OLANZAPINE SCH MG: 20 TABLET, ORALLY DISINTEGRATING ORAL at 20:49

## 2017-01-15 RX ADMIN — NICOTINE SCH PATCH: 21 PATCH, EXTENDED RELEASE TOPICAL at 09:00

## 2017-01-15 RX ADMIN — ACETAMINOPHEN PRN MG: 325 TABLET ORAL at 16:25

## 2017-01-15 RX ADMIN — CLINDAMYCIN PHOSPHATE SCH APPLIC: 10 SOLUTION TOPICAL at 21:00

## 2017-01-15 RX ADMIN — POLYETHYLENE GLYCOL 3350 SCH GM: 17 POWDER, FOR SOLUTION ORAL at 09:00

## 2017-01-15 RX ADMIN — CLINDAMYCIN PHOSPHATE SCH APPLIC: 10 SOLUTION TOPICAL at 09:00

## 2017-01-15 RX ADMIN — LITHIUM CARBONATE SCH MG: 300 CAPSULE, GELATIN COATED ORAL at 20:48

## 2017-01-15 NOTE — HHI.PYPN
Subjective


Remarks


Patient was seen and case discussed with nursing.  Lithium level was 0.7.  

Patient remains childlike and perseverative on attention.  She was kept on the 

2600 unit.  She remains perseverative on her ability to hurt others and 

herself.  However she is pleasant and friendly and cheerful.  Describes 

auditory hallucinations.  During this interview she denies active intent or of 

hurting herself or anyone else





Objective


Alert:  Yes


[place]:  Person, Place, Date, Situation


Mood:  Oppositional


Affect:  Euthymic


Memory Intact:  Immediate


Hallucinations:  Auditory (to herself and others)


Delusions:  No


Delusion Type:  Other


Suicidal:  Ideation (denies)


Homicidal:  Ideation (denies)


Insight/Judgement


Poor


Labs











Test 1/15/17





 10:18


 


Lithium Level 0.7 MEQ/L








Vitals/IOs





 Vital Signs








  Date Time  Temp Pulse Resp B/P Pulse Ox O2 Delivery O2 Flow Rate FiO2


 


1/15/17 05:06  83 16 121/70 97   


 


1/14/17 05:46 97.6       


 


1/13/17 15:15      Room Air  











Assessment & Plan


Problem List:  


(1) Adjustment disorder


ICD Code:  F43.20


(2) History of schizophrenia


ICD Code:  Z86.59


Assessment & Plan


Continue current treatment plan


Justification for Cont. Inpt.


Patient would decompensate in a less restrictive setting


Request HC Surrog/Guard Advoc?:  Yes








Chris Granger DO Madhav 15, 2017 14:07

## 2017-01-16 VITALS
RESPIRATION RATE: 17 BRPM | SYSTOLIC BLOOD PRESSURE: 145 MMHG | DIASTOLIC BLOOD PRESSURE: 87 MMHG | HEART RATE: 82 BPM | OXYGEN SATURATION: 100 % | TEMPERATURE: 98 F

## 2017-01-16 VITALS — RESPIRATION RATE: 16 BRPM | HEART RATE: 96 BPM | OXYGEN SATURATION: 97 %

## 2017-01-16 RX ADMIN — LEVOTHYROXINE SODIUM SCH MCG: 88 TABLET ORAL at 09:11

## 2017-01-16 RX ADMIN — OLANZAPINE SCH MG: 20 TABLET, ORALLY DISINTEGRATING ORAL at 20:58

## 2017-01-16 RX ADMIN — LITHIUM CARBONATE SCH MG: 300 CAPSULE, GELATIN COATED ORAL at 20:57

## 2017-01-16 RX ADMIN — POLYETHYLENE GLYCOL 3350 SCH GM: 17 POWDER, FOR SOLUTION ORAL at 09:11

## 2017-01-16 RX ADMIN — NICOTINE SCH PATCH: 21 PATCH, EXTENDED RELEASE TOPICAL at 09:00

## 2017-01-16 RX ADMIN — Medication SCH MG: at 09:11

## 2017-01-16 NOTE — HHI.PYPN
Subjective


Remarks


Patient seen and examined.  Chart reviewed.  Case discussed with nursing staff.

  On my examination today, the patient appears euthymic and reveling in the 

attention of the inpatient psychiatric unit.  She says "I'm still having those 

same problems, doc.  The bad voices.  I'd rather not go home because they'd 

send me back here.  I don't really feel safe there."  She says that she would 

have suicidal and homicidal thoughts in the group home setting.  She denies any 

suicidal or homicidal intent here on the inpatient unit.  She admits that she 

is not happy at her current group home.  Denies side effects from medications.  

No other issues noted.





Review of Systems


Other


No physical complaints today





Objective


Alert:  Yes


[place]:  Person, Place, Date, Situation


Mood:  Calm


Affect:  Euthymic (childlike)


Memory Intact:  Immediate


Hallucinations:  Auditory (reported command auditory hallucinations to hurt 

herself and others.  The patient does not appear to all internally stimulated)


Delusions:  No


Delusion Type:  Other (no delusions)


Suicidal:  Intent (denies), Ideation (not while on the inpatient unit)


Homicidal:  Intent (denies), Ideation (not while on the inpatient unit)


Insight/Judgement


Poor


Remarks


No abnormal motor movements noted


Labs


Labs reviewed.  No new labs.


Vitals/IOs





 Vital Signs








  Date Time  Temp Pulse Resp B/P Pulse Ox O2 Delivery O2 Flow Rate FiO2


 


1/16/17 06:48  96 16  97   


 


1/14/17 05:46 97.6       


 


1/13/17 15:15      Room Air  











Assessment & Plan


Problem List:  


(1) Adjustment disorder


ICD Code:  F43.20


(2) History of schizophrenia


ICD Code:  Z86.59


Assessment & Plan


Continue current psychotropics as ordered.  Continue to limit reward for 

attention seeking behaviors.  Continue other medications and care as ordered.


Justification for Cont. Inpt.


Risk for decompensation in a less restrictive setting


Discharge Planning


Possibly new group home placement versus return to her existing group home.


Request HC Surrog/Guard Advoc?:  Yes





Problem Qualifiers





(1) Adjustment disorder:  


Qualified Code:  F43.20 - Adjustment disorder, unspecified type





Manpreet Price MD Jan 16, 2017 15:14

## 2017-01-17 VITALS
TEMPERATURE: 98.1 F | RESPIRATION RATE: 18 BRPM | SYSTOLIC BLOOD PRESSURE: 136 MMHG | DIASTOLIC BLOOD PRESSURE: 74 MMHG | HEART RATE: 88 BPM

## 2017-01-17 VITALS
RESPIRATION RATE: 16 BRPM | SYSTOLIC BLOOD PRESSURE: 108 MMHG | HEART RATE: 65 BPM | OXYGEN SATURATION: 97 % | TEMPERATURE: 98.6 F | DIASTOLIC BLOOD PRESSURE: 67 MMHG

## 2017-01-17 RX ADMIN — NICOTINE SCH PATCH: 21 PATCH, EXTENDED RELEASE TOPICAL at 09:00

## 2017-01-17 RX ADMIN — LITHIUM CARBONATE SCH MG: 300 CAPSULE, GELATIN COATED ORAL at 20:18

## 2017-01-17 RX ADMIN — ACETAMINOPHEN PRN MG: 325 TABLET ORAL at 09:07

## 2017-01-17 RX ADMIN — OLANZAPINE SCH MG: 20 TABLET, ORALLY DISINTEGRATING ORAL at 20:20

## 2017-01-17 RX ADMIN — Medication SCH MG: at 09:00

## 2017-01-17 RX ADMIN — LEVOTHYROXINE SODIUM SCH MCG: 88 TABLET ORAL at 09:12

## 2017-01-17 RX ADMIN — POLYETHYLENE GLYCOL 3350 SCH GM: 17 POWDER, FOR SOLUTION ORAL at 09:00

## 2017-01-17 NOTE — HHI.PYPN
Subjective


Remarks


Patient seen and examined with counselor.  Chart reviewed.  Case discussed in 

treatment team.  On my examination today, the patient says that "Dr. Granger 

said that he put me on a one-to-one."  Prominent attention seeking noted.  

Patient says that she can't return to her group home just yet because "I still 

feel like I'm going to hurt myself or someone else there."  No reported urge to 

do either while on the unit here.  No side effects from medications.





Review of Systems


Other


No physical complaints today





Objective


Alert:  Yes


[place]:  Person, Place, Date, Situation


Mood:  Calm


Affect:  Euthymic (remains quite childlike)


Memory Intact:  Comment (fair)


Hallucinations:  Auditory (Does not appear int stim but reports CAHs before)


Delusions:  No


Delusion Type:  Other (No delusional material)


Suicidal:  Ideation (No SI on the unit. )


Homicidal:  Ideation (No HI on the unit.)


Insight/Judgement


Poor


Remarks


Thought process linear.


Labs


Labs reviewed.  No new labs.


Vitals/IOs





 Vital Signs








  Date Time  Temp Pulse Resp B/P Pulse Ox O2 Delivery O2 Flow Rate FiO2


 


1/17/17 05:30 98.6 65 16 108/67 97   


 


1/13/17 15:15      Room Air  











Assessment & Plan


Problem List:  


(1) Adjustment disorder


ICD Code:  F43.20


(2) History of schizophrenia


ICD Code:  Z86.59


Assessment & Plan


Continue current psychotropics as ordered.  Continue other medications and care 

as ordered.


Justification for Cont. Inpt.


Risk for decompensation.


Discharge Planning


Return to group home versus placement in a new group home setting.  Counselor 

to reach out to patient's mother to clarify.


Request HC Surrog/Guard Advoc?:  Yes





Problem Qualifiers





(1) Adjustment disorder:  


Qualified Code:  F43.20 - Adjustment disorder, unspecified type





Manpreet Price MD Jan 17, 2017 11:57

## 2017-01-18 VITALS
TEMPERATURE: 97.3 F | OXYGEN SATURATION: 98 % | HEART RATE: 79 BPM | RESPIRATION RATE: 18 BRPM | SYSTOLIC BLOOD PRESSURE: 158 MMHG | DIASTOLIC BLOOD PRESSURE: 93 MMHG

## 2017-01-18 VITALS — DIASTOLIC BLOOD PRESSURE: 93 MMHG | SYSTOLIC BLOOD PRESSURE: 158 MMHG | HEART RATE: 79 BPM | RESPIRATION RATE: 18 BRPM

## 2017-01-18 VITALS
SYSTOLIC BLOOD PRESSURE: 128 MMHG | OXYGEN SATURATION: 98 % | TEMPERATURE: 97.9 F | HEART RATE: 78 BPM | RESPIRATION RATE: 18 BRPM | DIASTOLIC BLOOD PRESSURE: 77 MMHG

## 2017-01-18 RX ADMIN — OLANZAPINE SCH MG: 20 TABLET, ORALLY DISINTEGRATING ORAL at 21:00

## 2017-01-18 RX ADMIN — Medication SCH MG: at 08:40

## 2017-01-18 RX ADMIN — POLYETHYLENE GLYCOL 3350 SCH GM: 17 POWDER, FOR SOLUTION ORAL at 08:40

## 2017-01-18 RX ADMIN — LITHIUM CARBONATE SCH MG: 300 CAPSULE, GELATIN COATED ORAL at 21:52

## 2017-01-18 RX ADMIN — NICOTINE SCH PATCH: 21 PATCH, EXTENDED RELEASE TOPICAL at 08:42

## 2017-01-18 RX ADMIN — ACETAMINOPHEN PRN MG: 325 TABLET ORAL at 21:58

## 2017-01-18 RX ADMIN — LEVOTHYROXINE SODIUM SCH MCG: 88 TABLET ORAL at 08:40

## 2017-01-18 NOTE — HHI.PYPN
Subjective


Remarks


Patient seen and examined.  Chart reviewed.  Case discussed with nursing staff 

are reports patient continues to display manipulative and attention seeking 

behaviors.  On my examination today, the patient says that she is still hearing 

voices as before, although she seems less and less invested in describing these 

voices to me.  She is happy that her mother is looking into getting her into a 

different group home and even expresses some impatience with the delay in 

arranging for transfer to the group home, which apparently hinges on getting 

funding from the [employer] which the mother is currently working on.  No current SI 

or HI.  No side effects from medications.





Review of Systems


Other


No physical complaints today





Objective


Alert:  Yes


[place]:  Person, Place, Date, Situation


Mood:  Calm


Affect:  Other (childlike)


Memory Intact:  Comment (remains fair)


Hallucinations:  Auditory (describes vague auditory hallucinations but again 

does not appear internally stimulated)


Delusions:  No


Delusion Type:  Other (no delusions)


Suicidal:  Ideation (no SI)


Homicidal:  Ideation (no HI)


Insight/Judgement


Poor


Remarks


Speech within normal limits for rate, tone and volume


Labs


Labs reviewed.  No new labs.


Vitals/IOs





 Vital Signs








  Date Time  Temp Pulse Resp B/P Pulse Ox O2 Delivery O2 Flow Rate FiO2


 


1/18/17 06:21 97.9 78 18 128/77 98   











Assessment & Plan


Problem List:  


(1) Adjustment disorder


ICD Code:  F43.20


(2) History of schizophrenia


ICD Code:  Z86.59


Assessment & Plan


No evidence of any suicidality or homicidality on the unit despite observation.

  Attention seeking behaviors persist, and we have been making a concerted 

effort not to play into these.  Continue current psychotropics as ordered.  

Continue other medications and care as ordered.


Justification for Cont. Inpt.


Discharge planning


Discharge Planning


Anticipate transition back to group home setting tomorrow


Request HC Surrog/Guard Advoc?:  Yes





Problem Qualifiers





(1) Adjustment disorder:  


Qualified Code:  F43.20 - Adjustment disorder, unspecified type





Manpreet Price MD Jan 18, 2017 15:28

## 2017-01-19 VITALS
DIASTOLIC BLOOD PRESSURE: 78 MMHG | HEART RATE: 77 BPM | TEMPERATURE: 97.8 F | SYSTOLIC BLOOD PRESSURE: 114 MMHG | RESPIRATION RATE: 16 BRPM

## 2017-01-19 VITALS
OXYGEN SATURATION: 100 % | RESPIRATION RATE: 16 BRPM | SYSTOLIC BLOOD PRESSURE: 132 MMHG | DIASTOLIC BLOOD PRESSURE: 93 MMHG | TEMPERATURE: 97.6 F | HEART RATE: 86 BPM

## 2017-01-19 RX ADMIN — POLYETHYLENE GLYCOL 3350 SCH GM: 17 POWDER, FOR SOLUTION ORAL at 09:00

## 2017-01-19 RX ADMIN — OLANZAPINE SCH MG: 20 TABLET, ORALLY DISINTEGRATING ORAL at 20:45

## 2017-01-19 RX ADMIN — LITHIUM CARBONATE SCH MG: 300 CAPSULE, GELATIN COATED ORAL at 20:42

## 2017-01-19 RX ADMIN — Medication SCH MG: at 09:08

## 2017-01-19 RX ADMIN — LEVOTHYROXINE SODIUM SCH MCG: 88 TABLET ORAL at 09:08

## 2017-01-19 NOTE — HHI.DS
Psychiatry Discharge Summary


Inpatient Psychiatric care?:  Yes


Advance Directive:  No


Reason Not Provided:  Due to Patient Condition


Mental Health AdvanceDirective:  No


Health Care Proxy:  No


Admission


Admission Date


Jan 13, 2017 at 15:47


Admission Diagnosis:  


(1) Adjustment disorder


ICD Code:  F43.20


(2) History of schizophrenia


ICD Code:  Z86.59


Brief History


Ms. Ashton is a 26-year-old  female with a history of adjustment 

disorder and schizophrenia who presents under a Sweet act for the third time in 

4 days alleging that she is hearing voices telling her to kill her roommate's 

at the group home.  Patient also allegedly self injured by banging her head 

against the wall.  I saw the patient in consultation yesterday and lifted her 

Sweet act because she has a history of voicing command auditory hallucinations 

and even engaging in self-injurious behavior as an attention seeking ploy and 

in order to get admitted to the inpatient psychiatric unit..  Additionally, 

patient's mother and guardian had requested that the patient not be admitted as 

admission reinforces this attention seeking behavior.  Reviewing the electronic 

medical record, she was admitted most recently, and part under my care, in 

September of last year.





Patient seen and examined.  Chart reviewed.  Case discussed with nurse in the J-

pod.  On my examination today, the patient says "Hey doc, same reason.  I don't 

think going home is the best idea.  I tried to give the medications that chance 

to work, but nothing changed.  I'm telling the truth doc.  Can we put me on the 

unit with all the cameras?"  Patient is hoping to go to the high acuity 2700 

unit, I suspect because it affords greater attention.  She insists she is still 

experiencing CAH, although she does not appear internally stimulated.  She does 

not describe any current SI or HI but insists that she cannot contract for 

safety in the community.  The remainder of the psychiatric ROS is negative.





Past psychiatric, family, chemical dependency, social history have been 

obtained repeatedly during previous encounters and are unchanged today.





I did speak with patient's mother, Ms. Cordero.  Given the apparent 

persistence of patient's behaviors, she agrees that hospitalization at this 

juncture is likely necessary.  She asks that we take steps to minimize 

attention paid to patient's behaviors, though, to avoid exacerbating the 

problem.


Tobacco Use In Past 30 Days:  No Tobacco Past 30 Days


Alcohol Use:  Never


Hospital Course


Patient was admitted to a locked, inpatient psychiatric unit.  Appropriate 

precautions were in place throughout patient's hospital stay.  Patient was seen 

and examined daily on the unit by psychiatry and also visited by counselor.  

Home medications were continued and patient tolerated medications well without 

side effects.  There was absolutely no evidence of suicidality or homicidality 

on the inpatient unit.  The patient continued to describe command auditory 

hallucinations at times but did not appear internally stimulated, nor was there 

any evidence that she was acting under the instruction of any sort of command 

auditory hallucinations.  It is the sense of myself and the treatment team that 

these reported symptoms are, as before, attention seeking in nature with the 

goal of maintaining the sick role and not the result of a primary psychotic 

disorder.  She frequently requested inappropriate escalations in her level of 

care such as wanting to be placed on a 1:1 or requesting transfer to the high 

acuity inpatient psychiatric unit.  On the day of discharge: Case discussed 

with nursing staff.  No problematic behaviors to report.  Case discussed with 

counselor.  Patient today does not describe any suicidal or homicidal ideation.

  She does not describe any hallucinations, command or otherwise.  She does 

threaten, if she has not retained on the unit for a duration of her liking, 

that she will manufacture some reason to return to the inpatient psychiatric 

unit.  Given that we have observed the patient for a week now with no evidence 

of any problematic behaviors, I  the patient has maximized benefit from 

this inpatient psychiatric hospital stay.  I did discuss the case with patient'

s mother, Ms. Cordero, who understands that there is no ongoing cause to 

retain the patient on the inpatient psychiatric unit.  Ms. Cordero continues 

to work on having patient transferred to a different group home, but there is 

at present no certain date for when this transfer might come through, I am 

told.  Patient is to follow up with outpatient psychiatry and primary care.





Results


Blood Pressure


114 / 78





 Vital Signs








  Date Time  Temp Pulse Resp B/P Pulse Ox O2 Delivery O2 Flow Rate FiO2


 


1/19/17 06:07 97.8 77 16 114/78    


 


1/18/17 18:00     98   











 Laboratory Results








Test 1/15/17





 10:18


 


Lithium Level 0.7 MEQ/L





 (0.5-1.5)








Summary of Procedures


None done


Imaging


None done


Pending results at discharge:  No





Medications


# of Antipsychotic meds at D/C:  1


Approp Antipsych med options


1 - Minimum of three failed multiple trials of monotherapy.


2 - Documented plan to taper to monotherapy due to previous use of multiple 

meds OR cross-taper in progress at D/C.


3 - Documentation of augmentation of Clozapine.


4 - Justification other than those listed in allowable values 1-3, document here

:





Discharge


Discharge Date:  Jan 19, 2017


Discharge Diagnosis:  


(1) Factitious disorder


Diagnosis:  Principal


ICD Code:  F68.10


Mental Status Exam at Disch


Patient is casually dressed.  She is fairly well groomed and maintaining basic 

hygiene.  She is awake and alert and oriented to person and hospital at least.  

No new abnormal motor movements noted.  Speech is within normal limits for rate

, tone and volume.  Language and fund of knowledge seem average.  Mood and 

affect are childlike but fairly full and reactive and certainly not depressed 

or manic/hypomanic.  Thought process linear.  No loosening of associations.  No 

evident delusions.  No reported AVH.  No reported suicidal or homicidal 

ideation at this time.  Insight and judgment are poor, chronically so.


Pt Condition on Discharge:  Stable


Discharge Disposition:  [facility]





Discharge Instructions


Diet Instructions:  As Tolerated, No Restrictions


Activities you can perform:  Weight Bearing as Judd


Scheduled Appointment:  [facility]


Appointment Date:  Jan 23, 2017


Appointment Time:  10:30am


Continued Medications:  


Cholecalciferol (D 1000) 1,000 Unit Cap





Clindamycin Topical (Clindamycin Topical) 1% Gel


1 APPLIC TOPICAL BID ACNE #30 Ref 0 GM


Cyanocobalamin (B-12) 1,000 Mcg Lozg


1000 MCG IM q30 days Nutritional Supplement #1 Ref 0 BOTTLE


Lamotrigine (Lamictal) 200 Mg Tab


200 MG PO BID health #60 Ref 2 TAB


Levonorgestrel-Ethinyl Estradiol (Daysee) 0.15-0.03-0.01 Mg Tab


1 TAB PO DAILY Birth Control #91 Ref 0 TAB


Levothyroxine (Levothyroxine) 88 Mcg Tab


88 MCG PO DAILY Thyroid #30 Ref 2 TAB


Lithium Carbonate (Lithium Carbonate) 300 Mg Cap


300 MG PO 3 hs health #90 Ref 2 CAP


Lorazepam (Ativan) 0.5 Mg Tab


0.5 MG PO BID anxiety #60 Ref 2 TAB


Olanzapine Odt (Zyprexa Zydis) 20 Mg Tab


20 MG SL HS health #30 Ref 2 TAB


Polyethylene Glycol 3350 (Bulk (Polyethylene Glycol 3350) 1 Gra Gra


3350 MEQ PO DAILY Constipation Days 30 BOX


Polysaccharide Iron Complex (Poly-Iron 150) 150 Mg Cap


150 MG PO DAILY Nutritional Supplement #30 Ref 0 CAP


Sodium Fluoride-Potassium Nitr (Prevident 5000 Sensitive 1.1-5 %) 1 Pst Pst


Unknown Dose PO HS








Discharge Time


<= 30 minutes





Discharge/Advance Care Plan


Health Problems:  


(1) Adjustment disorder


(2) History of schizophrenia


Goals to promote your health


* To prevent worsening of your condition and complications


* To maintain your health at the optimal level


Directions to meet your goals


*** Take your medications as prescribed


***  Follow your dietary instruction


***  Follow activity as directed





***  Keep your appointments as scheduled


***  Take your immunizations and boosters as scheduled


***  If your symptoms worsen call your PCP, if no PCP go to Urgent Care Center 

or Emergency Room ***


***  For 24/7 questions related to your inpatient stay or results of tests 

pending at discharge, please contact Dr. Manpreet Price at [phone]


***  Smoking is Dangerous to Your Health. Avoid second hand smoking ***





Problem Qualifiers





(1) Adjustment disorder:  


Qualified Code:  F43.20 - Adjustment disorder, unspecified type





Manpreet Price MD Jan 19, 2017 15:50

## 2017-01-20 VITALS
OXYGEN SATURATION: 99 % | TEMPERATURE: 97.6 F | DIASTOLIC BLOOD PRESSURE: 67 MMHG | SYSTOLIC BLOOD PRESSURE: 104 MMHG | RESPIRATION RATE: 16 BRPM | HEART RATE: 76 BPM

## 2017-01-20 RX ADMIN — LEVOTHYROXINE SODIUM SCH MCG: 88 TABLET ORAL at 08:33

## 2017-01-20 RX ADMIN — POLYETHYLENE GLYCOL 3350 SCH GM: 17 POWDER, FOR SOLUTION ORAL at 08:33

## 2017-01-20 RX ADMIN — Medication SCH MG: at 08:33

## 2017-01-20 NOTE — HHI.PYPN
Subjective


Remarks


Discharge held yesterday because group home didn't come to pick patient up; 

they are coming this morning.  Pt seen and examined.  Chart reviewed.  Case d/w 

RN.  No behavioral issues overnight.  On my exam today, pt remains attention 

seeking and childlike.  Continues to complain of vague voices but there is no 

evidence of internal stimulation.  Does not articulate any SI or HI.  Continues 

to threaten to manufacture symptoms to have her returned to ED if discharged 

today.  No evident side effects from meds.





Review of Systems


ROS Limitations:  Poor Historian


Other


No physical complaints.





Objective


Alert:  Yes


[place]:  Person (Again O x 3)


Mood:  Calm


Affect:  Other (Childlike)


Memory Intact:  Comment (Remains fair)


Hallucinations:  Auditory (Reports vague AH but does not appear internally 

stimulated)


Delusions:  No


Delusion Type:  Other (No delusions)


Suicidal:  Ideation (No suicidal ideation, intent or plan)


Homicidal:  Ideation (No homicidal ideation, intent or plan)


Insight/Judgement


Poor


Remarks


No motoric abnormalities noted.  Thought process linear.


Labs


Labs reviewed.  No new labs.


Vitals/IOs





 Vital Signs








  Date Time  Temp Pulse Resp B/P Pulse Ox O2 Delivery O2 Flow Rate FiO2


 


1/20/17 05:51 97.6 76 16 104/67 99   








 Intake and Output








 1/19/17 1/19/17 1/20/17





 08:00 16:00 00:00


 


Intake Total   240 ml


 


Balance   240 ml











Assessment & Plan


Problem List:  


(1) Factitious disorder


ICD Code:  F68.10


Assessment & Plan


Pt remains at her baseline.  Continue current psychotropics as ordered.  

Discharge back to group home this morning.


Justification for Cont. Inpt.


Discharge today.


Discharge Planning


Discharge today.


Request HC Surrog/Guard Advoc?:  Yes








Manpreet Price MD Jan 20, 2017 07:17

## 2017-09-23 ENCOUNTER — HOSPITAL ENCOUNTER (EMERGENCY)
Dept: HOSPITAL 17 - NEPD | Age: 27
LOS: 1 days | Discharge: HOME | End: 2017-09-24
Payer: COMMERCIAL

## 2017-09-23 VITALS — WEIGHT: 143.3 LBS | HEIGHT: 66 IN | BODY MASS INDEX: 23.03 KG/M2

## 2017-09-23 VITALS
HEART RATE: 88 BPM | TEMPERATURE: 97.6 F | SYSTOLIC BLOOD PRESSURE: 130 MMHG | OXYGEN SATURATION: 99 % | RESPIRATION RATE: 17 BRPM | DIASTOLIC BLOOD PRESSURE: 78 MMHG

## 2017-09-23 DIAGNOSIS — F79: Primary | ICD-10-CM

## 2017-09-23 DIAGNOSIS — G80.9: ICD-10-CM

## 2017-09-23 DIAGNOSIS — F20.0: ICD-10-CM

## 2017-09-23 PROCEDURE — 99283 EMERGENCY DEPT VISIT LOW MDM: CPT

## 2017-09-23 NOTE — PD
HPI


.


Psychosis


Chief Complaint:  Psychiatric Symptoms


Time Seen by Provider:  12:41


Travel History


International Travel<30 days:  No


Contact w/Intl Traveler<30days:  No


Traveled to known affect area:  No





History of Present Illness


HPI


This patient presents under the Baker Act for acute psychosis.  She has a 

history of paranoid schizophrenia.  She is now hearing voices.  Her Baker Act 

reports that she was being physically restrained at her residence to prevent 

her from harming herself or others because the voices are instructing her to do 

harmful things.





PFSH


Past Medical History


Anxiety:  Yes


Cerebral Palsy:  Yes


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


Developmental Delay:  Yes


Diminished Hearing:  No


Endocrine:  No


Genitourinary:  No


Headaches:  No


Immune Disorder:  No


Musculoskeletal:  No


Neurologic:  Yes (right hand contracture)


Psychiatric:  Yes (Hx of treatment for Schizoaffective Disorder)


Reproductive:  No


Respiratory:  No


Immunizations Current:  Yes


Migraines:  No


Pancreatitis:  Yes


Schizophrenia:  Yes (HEARING HALLUCINATION SINCE 10 YEARS OLD )


Pregnant?:  Unknown





Past Surgical History


Cholecystectomy:  Yes


Thoracic Surgery:  Yes (G-TUBE INSERTION AND REMOVAL)


Other Surgery:  Yes





Social History


Alcohol Use:  No


Tobacco Use:  No


Substance Use:  No





Allergies-Medications


(Allergen,Severity, Reaction):  


Coded Allergies:  


     Fish Containing Products (Unverified  Allergy, Severe, Anaphylaxis, 9/23/17

)


     shrimp (Verified  Allergy, Intermediate, 9/23/17)


Reported Meds & Prescriptions





Reported Meds & Active Scripts


Active


Zyprexa Zydis (Olanzapine) 20 Mg Tab 20 Mg SL HS


Lithium Carbonate 300 Mg Cap 300 Mg PO 3 HS


Ativan (Lorazepam) 0.5 Mg Tab 0.5 Mg PO BID


Lamictal (Lamotrigine) 200 Mg Tab 200 Mg PO BID


Levothyroxine (Levothyroxine Sodium) 88 Mcg Tab 88 Mcg PO DAILY


Poly-Iron 150 (Polysaccharide Iron Complex) 150 Mg Cap 150 Mg PO DAILY


B-12 (Cyanocobalamin) 1,000 Mcg Lozg 1,000 Mcg IM Q30 DAYS


Polyethylene Glycol 3350 (Polyethylene Glycol 3350 (Bulk) 1 Gra Gra 3,350 Meq 

PO DAILY 30 Days


Reported


D 1000 (Cholecalciferol) 1,000 Unit Cap   


Daysee (Levonorgestrel-Ethinyl Estradiol) 0.15-0.03-0.01 Mg Tab 1 Tab PO DAILY


Prevident 5000 Sensitive 1.1-5 % (Sodium Fluoride-Potassium Nitr) 1 Pst Pst 

Unknown Dose PO HS


Clindamycin Topical (Clindamycin Phosphate) 1% Gel 1 Applic TOPICAL BID








Review of Systems


Except as stated in HPI:  all other systems reviewed are Neg


Psychiatric:  Positive: Suicidal Ideations, Disorder of Thought





Physical Exam


Narrative


GENERAL: The patient is sitting on a stretcher hugging a tahira bear.


SKIN:  warm/dry.  No rashes.


HEAD: Normocephalic.  Atraumatic.


EYES: Pupils equal and round.


NECK: Full range of motion without pain.. 


CARDIOVASCULAR: Regular rate and rhythm.  


RESPIRATORY: No accessory muscle use.


MUSCULOSKELETAL: No obvious deformities. 


NEUROLOGICAL: Awake and alert. No obvious cranial nerve deficits.  Motor 

grossly within normal limits. Normal speech.


PSYCHIATRIC: She appears to be responding to internal stimuli.





Data


Data


Last Documented VS





Vital Signs








  Date Time  Temp Pulse Resp B/P (MAP) Pulse Ox O2 Delivery O2 Flow Rate FiO2


 


9/23/17 12:47 97.6 88 17 130/78 (95) 99   











MDM


Medical Decision Making


Medical Screen Exam Complete:  Yes


Emergency Medical Condition:  Yes


Medical Record Reviewed:  Yes (history of paranoid schizophrenia.)


Differential Diagnosis


Differential diagnosis of psychosis includes but is not limited to schizophrenia

, schizoaffective disorder, bipolar disorder, intoxication, substance abuse, 

dementia


Narrative Course


This patient is brought in as a Sweet Act.  She has a history of paranoid 

schizophrenia and is having an acute exacerbation.  This patient is medically 

clear for psychiatric evaluation.





Diagnosis





 Primary Impression:  


 Medical clearance for psychiatric admission


Condition:  Stable











Belle Guevara MD Sep 23, 2017 12:58

## 2017-09-24 VITALS
RESPIRATION RATE: 18 BRPM | HEART RATE: 76 BPM | DIASTOLIC BLOOD PRESSURE: 70 MMHG | OXYGEN SATURATION: 99 % | SYSTOLIC BLOOD PRESSURE: 126 MMHG

## 2017-09-24 NOTE — PD
__________________________________________________





History of Present Illness


Chief Complaint:  Psychiatric Symptoms


Time Seen by Provider:  14:50


Travel History


International Travel<30 Days:  No


Contact w/Intl Traveler<30days:  No


Known affected area:  No





Legal Status


Legal Status:  Baker Act


History of Present Illness:


26-year-old female Sweet acted for suicidality and hearing voices "inside my 

head".  Patient is mentally retarded and not truly psychotic.  She is having 

altercations at her group home.  She would like a different group home but she 

does not wish to kill herself.  This physician recognizes her cognition is at 

baseline and she has a developmental disability.  These individuals are not 

supposed to be Sweet acted for their developmental disabilities but this is a 

law enforcement Baker act.  Patient was asked to call her  to 

change group homes as this is her main goal in coming to the emergency 

department.  She is not truly suicidal or homicidal or psychotic.





PFSH


Past Medical History


Anxiety:  Yes


Cerebral Palsy:  Yes


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


Developmental Delay:  Yes


Diminished Hearing:  No


Endocrine:  No


Genitourinary:  No


Headaches:  No


Immune Disorder:  No


Musculoskeletal:  No


Neurologic:  Yes (right hand contracture)


Psychiatric:  Yes (Hx of treatment for Schizoaffective Disorder)


Reproductive:  No


Respiratory:  No


Immunizations Current:  Yes


Migraines:  No


Pancreatitis:  Yes


Schizophrenia:  Yes (HEARING HALLUCINATION SINCE 10 YEARS OLD )


Pregnant?:  Unknown





Past Surgical History


Cholecystectomy:  Yes


Thoracic Surgery:  Yes (G-TUBE INSERTION AND REMOVAL)


Other Surgery:  Yes





Psychiatric History


Psychiatric History


Hx Psychiatric Treatment:  


Pt has a history of inpatient psychiatric hospitalizations at various  


facilities including [facility] with last visit occurring in September of 2016.  


She has a history of outpatient psychiatric services and most recently has  


been seeing Dr. Cabral though she must find a new provider as this  


clinic is closing. She reports some mixed compliance with regiment due to  


the "voices".


History of Inpatient Treatment:  Yes


Guns or firearms in home:  No





Social History


Hx Alcohol Use:  No


Hx Tobacco Use:  No


Hx Substance Use:  No


Hx of Substance Use Treatment:  No





Allergies-Medications


(Allergen,Severity, Reaction):  


Coded Allergies:  


     Fish Containing Products (Unverified  Allergy, Severe, Anaphylaxis, 9/23/17

)


     shrimp (Verified  Allergy, Intermediate, 9/23/17)


Reported Meds & Prescriptions





Reported Meds & Active Scripts


Active


Zyprexa Zydis (Olanzapine) 20 Mg Tab 20 Mg SL HS


Lithium Carbonate 300 Mg Cap 300 Mg PO 3 HS


Ativan (Lorazepam) 0.5 Mg Tab 0.5 Mg PO BID


Lamictal (Lamotrigine) 200 Mg Tab 200 Mg PO BID


Levothyroxine (Levothyroxine Sodium) 88 Mcg Tab 88 Mcg PO DAILY


Poly-Iron 150 (Polysaccharide Iron Complex) 150 Mg Cap 150 Mg PO DAILY


B-12 (Cyanocobalamin) 1,000 Mcg Lozg 1,000 Mcg IM Q30 DAYS


Polyethylene Glycol 3350 (Polyethylene Glycol 3350 (Bulk) 1 Gra Gra 3,350 Meq 

PO DAILY 30 Days


Reported


D 1000 (Cholecalciferol) 1,000 Unit Cap   


Daysee (Levonorgestrel-Ethinyl Estradiol) 0.15-0.03-0.01 Mg Tab 1 Tab PO DAILY


Prevident 5000 Sensitive 1.1-5 % (Sodium Fluoride-Potassium Nitr) 1 Pst Pst 

Unknown Dose PO HS


Clindamycin Topical (Clindamycin Phosphate) 1% Gel 1 Applic TOPICAL BID





Review of Systems


Except as stated in HPI:  all other systems reviewed are Neg





Exam


Alert:  Yes


Hazard:  Person, Place, Date, Situation


Mood:  Calm


Affect:  Appropriate


Speech:  Clear, Logical


Eye Contact:  Normal


Memory Intact:  Immediate, Recent, Remote


Insight/Judgement


Adequate





MDM


Medical Decision Making


Medical Record Reviewed:  Yes


Assessment/Plan


Patient interviewed at bedside, medical record reviewed and case discussed with 

nurse.  In this physician's opinion, patient does not qualify for Baker act or 

involuntary psychiatric hospitalization.  She needs to return to group home and 

contact her  if she wishes to change group homes.





Orders





 Orders


Psych Screen (9/23/17 22:03)


Diet Regular Basic (9/24/17 Breakfast)


Diet Regular Basic (9/24/17 Lunch)


Vascular Poc Ultrasound (9/24/17 )





Results





Vital Signs








  Date Time  Temp Pulse Resp B/P (MAP) Pulse Ox O2 Delivery O2 Flow Rate FiO2


 


9/24/17 07:36  76 18 126/70 (88) 99 Room Air  











Diagnosis





 Primary Impression:  


 Mental retardation


Condition:  Stable











Prince Gaston MD Sep 24, 2017 15:07

## 2017-11-17 ENCOUNTER — HOSPITAL ENCOUNTER (EMERGENCY)
Dept: HOSPITAL 17 - NEPD | Age: 27
LOS: 1 days | Discharge: HOME | End: 2017-11-18
Payer: COMMERCIAL

## 2017-11-17 VITALS — WEIGHT: 143.3 LBS | BODY MASS INDEX: 23.31 KG/M2 | HEIGHT: 65.9 IN

## 2017-11-17 VITALS
DIASTOLIC BLOOD PRESSURE: 97 MMHG | SYSTOLIC BLOOD PRESSURE: 145 MMHG | HEART RATE: 98 BPM | RESPIRATION RATE: 16 BRPM | TEMPERATURE: 98.4 F | OXYGEN SATURATION: 98 %

## 2017-11-17 VITALS — DIASTOLIC BLOOD PRESSURE: 116 MMHG | SYSTOLIC BLOOD PRESSURE: 178 MMHG | OXYGEN SATURATION: 100 % | HEART RATE: 94 BPM

## 2017-11-17 VITALS
HEART RATE: 100 BPM | DIASTOLIC BLOOD PRESSURE: 88 MMHG | TEMPERATURE: 98.2 F | SYSTOLIC BLOOD PRESSURE: 147 MMHG | RESPIRATION RATE: 17 BRPM | OXYGEN SATURATION: 96 %

## 2017-11-17 VITALS — SYSTOLIC BLOOD PRESSURE: 159 MMHG | HEART RATE: 83 BPM | DIASTOLIC BLOOD PRESSURE: 94 MMHG

## 2017-11-17 DIAGNOSIS — F79: ICD-10-CM

## 2017-11-17 DIAGNOSIS — Z79.899: ICD-10-CM

## 2017-11-17 DIAGNOSIS — F43.25: Primary | ICD-10-CM

## 2017-11-17 LAB
ANION GAP SERPL CALC-SCNC: 7 MEQ/L (ref 5–15)
BACTERIA #/AREA URNS HPF: (no result) /HPF
BASOPHILS # BLD AUTO: 0 TH/MM3 (ref 0–0.2)
BASOPHILS NFR BLD: 0.3 % (ref 0–2)
BUN SERPL-MCNC: 11 MG/DL (ref 7–18)
CHLORIDE SERPL-SCNC: 109 MEQ/L (ref 98–107)
COLOR UR: (no result)
COMMENT (UR): (no result)
CULTURE IF INDICATED: (no result)
EOSINOPHIL # BLD: 0 TH/MM3 (ref 0–0.4)
EOSINOPHIL NFR BLD: 0.2 % (ref 0–4)
ERYTHROCYTE [DISTWIDTH] IN BLOOD BY AUTOMATED COUNT: 13.8 % (ref 11.6–17.2)
ETHANOL SERPL-MCNC: (no result) MG/DL (ref 0–5)
GFR SERPLBLD BASED ON 1.73 SQ M-ARVRAT: 66 ML/MIN (ref 89–?)
GLUCOSE UR STRIP-MCNC: (no result) MG/DL
HCO3 BLD-SCNC: 27.6 MEQ/L (ref 21–32)
HCT VFR BLD CALC: 44.7 % (ref 35–46)
HEMO FLAGS: (no result)
HGB UR QL STRIP: (no result)
KETONES UR STRIP-MCNC: (no result) MG/DL
LEUKOCYTE ESTERASE UR QL STRIP: (no result) /HPF (ref 0–5)
LYMPHOCYTES # BLD AUTO: 1.6 TH/MM3 (ref 1–4.8)
LYMPHOCYTES NFR BLD AUTO: 12.5 % (ref 9–44)
MCH RBC QN AUTO: 30.5 PG (ref 27–34)
MCHC RBC AUTO-ENTMCNC: 34 % (ref 32–36)
MCV RBC AUTO: 89.7 FL (ref 80–100)
MONOCYTES NFR BLD: 4.5 % (ref 0–8)
NEUTROPHILS # BLD AUTO: 10.6 TH/MM3 (ref 1.8–7.7)
NEUTROPHILS NFR BLD AUTO: 82.5 % (ref 16–70)
NITRITE UR QL STRIP: (no result)
PLATELET # BLD: 363 TH/MM3 (ref 150–450)
POTASSIUM SERPL-SCNC: 3.8 MEQ/L (ref 3.5–5.1)
RBC # BLD AUTO: 4.98 MIL/MM3 (ref 4–5.3)
RBC #/AREA URNS HPF: (no result) /HPF (ref 0–3)
SODIUM SERPL-SCNC: 144 MEQ/L (ref 136–145)
SP GR UR STRIP: 1 (ref 1–1.03)
SQUAMOUS #/AREA URNS HPF: (no result) /HPF (ref 0–5)
WBC # BLD AUTO: 12.8 TH/MM3 (ref 4–11)

## 2017-11-17 PROCEDURE — 87086 URINE CULTURE/COLONY COUNT: CPT

## 2017-11-17 PROCEDURE — 84703 CHORIONIC GONADOTROPIN ASSAY: CPT

## 2017-11-17 PROCEDURE — 80307 DRUG TEST PRSMV CHEM ANLYZR: CPT

## 2017-11-17 PROCEDURE — 81001 URINALYSIS AUTO W/SCOPE: CPT

## 2017-11-17 PROCEDURE — 80048 BASIC METABOLIC PNL TOTAL CA: CPT

## 2017-11-17 PROCEDURE — 85025 COMPLETE CBC W/AUTO DIFF WBC: CPT

## 2017-11-17 PROCEDURE — 99284 EMERGENCY DEPT VISIT MOD MDM: CPT

## 2017-11-17 NOTE — PD
HPI


Chief Complaint:  Psychiatric Symptoms


Time Seen by Provider:  13:23


Travel History


International Travel<30 days:  No


Contact w/Intl Traveler<30days:  No


Traveled to known affect area:  No





History of Present Illness


HPI


26-year-old female with history of mental retardation and develop mental 

disability presents to the emergency room under Baker act initiated by Police 

Department.  Patient is difficult to extract information from.  Basically she 

is 9 she was walking in traffic, threatening to kill herself.  The group home 

and came to collect her and she became angry because she did not want to be 

restrained so she began bashing her head on the concrete and screaming that she 

wanted to kill herself.  At that time the police were called and she was 

brought to the emergency room.  Patient states she is "having a crisis."  

States she is depressed and wants to die.  Denies homicidal ideation.  Denies 

visual or auditory hallucinations at this time.  States she sometimes hears 

voices but not now.  She also reports itchy, red rash to her vagina that has 

been ongoing and is being taken care of at the group home where she lives with 

topical cream.  Patient denies any sexual abuse.





PFSH


Past Medical History


Anxiety:  Yes


Cerebral Palsy:  Yes


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


Developmental Delay:  Yes


Diminished Hearing:  No


Endocrine:  No


Genitourinary:  No


Headaches:  No


Immune Disorder:  No


Musculoskeletal:  No


Neurologic:  Yes (right hand contracture)


Psychiatric:  Yes (Hx of treatment for Schizoaffective Disorder)


Reproductive:  No


Respiratory:  No


Immunizations Current:  Yes


Migraines:  No


Pancreatitis:  Yes


Schizophrenia:  Yes (HEARING HALLUCINATION SINCE 10 YEARS OLD )





Past Surgical History


Cholecystectomy:  Yes


Thoracic Surgery:  Yes (G-TUBE INSERTION AND REMOVAL)


Other Surgery:  Yes





Social History


Alcohol Use:  No


Tobacco Use:  No


Substance Use:  No





Allergies-Medications


(Allergen,Severity, Reaction):  


Coded Allergies:  


     Fish Containing Products (Unverified  Allergy, Severe, Anaphylaxis, 9/23/17

)


     shrimp (Verified  Allergy, Intermediate, 9/23/17)


Reported Meds & Prescriptions





Reported Meds & Active Scripts


Active


Zyprexa Zydis (Olanzapine) 20 Mg Tab 20 Mg SL HS


Lithium Carbonate 300 Mg Cap 300 Mg PO 3 HS


Ativan (Lorazepam) 0.5 Mg Tab 0.5 Mg PO BID


Lamictal (Lamotrigine) 200 Mg Tab 200 Mg PO BID


Levothyroxine (Levothyroxine Sodium) 88 Mcg Tab 88 Mcg PO DAILY


Poly-Iron 150 (Polysaccharide Iron Complex) 150 Mg Cap 150 Mg PO DAILY


B-12 (Cyanocobalamin) 1,000 Mcg Lozg 1,000 Mcg IM Q30 DAYS


Polyethylene Glycol 3350 (Polyethylene Glycol 3350 (Bulk) 1 Gra Gra 3,350 Meq 

PO DAILY 30 Days


Reported


D 1000 (Cholecalciferol) 1,000 Unit Cap   


Daysee (Levonorgestrel-Ethinyl Estradiol) 0.15-0.03-0.01 Mg Tab 1 Tab PO DAILY


Prevident 5000 Sensitive 1.1-5 % (Sodium Fluoride-Potassium Nitr) 1 Pst Pst 

Unknown Dose PO HS


Clindamycin Topical (Clindamycin Phosphate) 1% Gel 1 Applic TOPICAL BID








Review of Systems


Except as stated in HPI:  all other systems reviewed are Neg





Physical Exam


Narrative


GENERAL: Well-nourished, well-developed female in no acute distress.  Afebrile.

  Ambulatory.


SKIN: Focused skin assessment warm/dry.


HEAD: Normocephalic.


EYES: No scleral icterus. No injection or drainage. 


NECK: Supple, trachea midline. No JVD or lymphadenopathy.


CARDIOVASCULAR: Regular rate and rhythm without murmurs, gallops, or rubs. 


RESPIRATORY: Breath sounds equal bilaterally. No accessory muscle use.


PSYCHIATRIC: No delusional thought processes. No hallucinations.  Normal 

affect.  She does not appear to be responding to internal stimuli.





Data


Data


Last Documented VS





Vital Signs








  Date Time  Temp Pulse Resp B/P (MAP) Pulse Ox O2 Delivery O2 Flow Rate FiO2


 


11/17/17 13:56  96 20     


 


11/17/17 12:40 98.4   145/97 (113) 98   








Orders





 Orders


Complete Blood Count With Diff (11/17/17 13:33)


Basic Metabolic Panel (Bmp) (11/17/17 13:33)


Urinalysis - C+S If Indicated (11/17/17 13:33)


Ed Urine Pregnancytest Poc (11/17/17 13:33)


Psych Screen (11/17/17 13:33)


Drug Screen, Random Urine (11/17/17 13:33)


Alcohol (Ethanol) (11/17/17 13:33)


Urine Culture (11/17/17 13:30)





Labs





Laboratory Tests








Test


  11/17/17


13:30 11/17/17


13:33 11/17/17


14:35


 


Urine Color LIGHT-YELLOW   


 


Urine Turbidity CLEAR   


 


Urine pH 6.5   


 


Urine Specific Gravity 1.004   


 


Urine Protein NEG mg/dL   


 


Urine Glucose (UA) NEG mg/dL   


 


Urine Ketones NEG mg/dL   


 


Urine Occult Blood NEG   


 


Urine Nitrite NEG   


 


Urine Bilirubin NEG   


 


Urine Urobilinogen


  LESS THAN 2.0


MG/DL 


  


 


 


Urine Leukocyte Esterase TRACE   


 


Urine RBC 0-3 /hpf   


 


Urine WBC 0-2 /hpf   


 


Urine Squamous Epithelial


Cells 0-5 /hpf 


  


  


 


 


Urine Bacteria MOD /hpf   


 


Microscopic Urinalysis Comment


  CULTURE


INDICATED 


  


 


 


Urine Opiates Screen  NEG  


 


Urine Barbiturates Screen  NEG  


 


Urine Amphetamines Screen  NEG  


 


Urine Benzodiazepines Screen  NEG  


 


Urine Cocaine Screen  NEG  


 


Urine Cannabinoids Screen  NEG  


 


White Blood Count   12.8 TH/MM3 


 


Red Blood Count   4.98 MIL/MM3 


 


Hemoglobin   15.2 GM/DL 


 


Hematocrit   44.7 % 


 


Mean Corpuscular Volume   89.7 FL 


 


Mean Corpuscular Hemoglobin   30.5 PG 


 


Mean Corpuscular Hemoglobin


Concent 


  


  34.0 % 


 


 


Red Cell Distribution Width   13.8 % 


 


Platelet Count   363 TH/MM3 


 


Mean Platelet Volume   6.7 FL 


 


Neutrophils (%) (Auto)   82.5 % 


 


Lymphocytes (%) (Auto)   12.5 % 


 


Monocytes (%) (Auto)   4.5 % 


 


Eosinophils (%) (Auto)   0.2 % 


 


Basophils (%) (Auto)   0.3 % 


 


Neutrophils # (Auto)   10.6 TH/MM3 


 


Lymphocytes # (Auto)   1.6 TH/MM3 


 


Monocytes # (Auto)   0.6 TH/MM3 


 


Eosinophils # (Auto)   0.0 TH/MM3 


 


Basophils # (Auto)   0.0 TH/MM3 


 


CBC Comment   DIFF FINAL 


 


Differential Comment    


 


Blood Urea Nitrogen   11 MG/DL 


 


Creatinine   1.02 MG/DL 


 


Random Glucose   81 MG/DL 


 


Calcium Level   9.9 MG/DL 


 


Sodium Level   144 MEQ/L 


 


Potassium Level   3.8 MEQ/L 


 


Chloride Level   109 MEQ/L 


 


Carbon Dioxide Level   27.6 MEQ/L 


 


Anion Gap   7 MEQ/L 


 


Estimat Glomerular Filtration


Rate 


  


  66 ML/MIN 


 


 


Ethyl Alcohol Level


  


  


  LESS THAN 3


MG/DL











[facility]


Medical Decision Making


Medical Screen Exam Complete:  Yes


Emergency Medical Condition:  Yes


Medical Record Reviewed:  Yes


Differential Diagnosis


Schizophrenia, fictitious disorder, adjustment disorder, suicidal ideation, 

mood disorder


Narrative Course


26-year-old female presents to the emergency room for evaluation of suicidal 

ideation.  She is under a Baker act.  Patient states she was walking in a row 

this morning in an effort to kill herself.  When the group home van tried to 

restrain her, she began to scream that she wanted to kill herself and banged 

her head on the road.  Police were then called.  Patient states she is 

depressed and suicidal.  Patient states that she does not get to go to the [address] 

unit with a one-on-one sitter and a TV, she will incessantly scream that she 

wants to kill herself.  She has made these statements in the past.  

Psychiatrist previously documented that she is attention seeking.  Her mother 

called during the ER stay stating the patient thinks of the [address] unit as a 

reward.  Denies auditory or or visual hallucinations.  She is being treated for 

what sounds like a yeast infection with cream at her group home.  Denies any 

other medical complaints.  CBC, BMP are essentially unremarkable.  Drug screen 

is negative.  Alcohol negative.  Urine pregnancy test negative.  UA is not 

particularly concerning for UTI.  Vital signs stable.  Patient is medically 

cleared for psychiatric evaluation.


Condition:  Stable











Maria C Barnett Nov 17, 2017 13:42

## 2017-11-18 VITALS
OXYGEN SATURATION: 97 % | RESPIRATION RATE: 16 BRPM | DIASTOLIC BLOOD PRESSURE: 64 MMHG | SYSTOLIC BLOOD PRESSURE: 128 MMHG | HEART RATE: 68 BPM

## 2017-11-18 VITALS
DIASTOLIC BLOOD PRESSURE: 71 MMHG | HEART RATE: 81 BPM | TEMPERATURE: 97.4 F | RESPIRATION RATE: 17 BRPM | OXYGEN SATURATION: 95 % | SYSTOLIC BLOOD PRESSURE: 130 MMHG

## 2017-11-18 VITALS
SYSTOLIC BLOOD PRESSURE: 125 MMHG | HEART RATE: 92 BPM | OXYGEN SATURATION: 95 % | DIASTOLIC BLOOD PRESSURE: 78 MMHG | RESPIRATION RATE: 18 BRPM

## 2017-11-18 NOTE — PD
__________________________________________________





History of Present Illness


Chief Complaint:  Psychiatric Symptoms


Time Seen by Provider:  12:15


Travel History


International Travel<30 Days:  No


Contact w/Intl Traveler<30days:  No


Known affected area:  No





Legal Status


Legal Status:  Baker Act


Baker Act Signed By:  Tobias Riggins


Baker Act Comment:  11/17/2017 1129 AM D/S FERMÍN CALLAHAN #[id number] [phone]


History of Present Illness:


History of Present Illness


HPI


26-year-old female with history of intellectual disability, adjustment disorder

, record history of schizophrenia, as well as factitious disorder who presents 

to the emergency room under Baker act initiated by Police Department. 


The Sweet act alleges that they responded to a call  in reference to a mentally 

ill person. The  officer observed the patient sitting next to the side of the 

road next to her counselor.  The counselor reported that the patient had jumped 

out of their van while transporting her back to her group room.  Patient also 

began to bang her head against the sidewalk when in the presence of the 

officers.  Patient reported to ED staff that she was having a crisis, feeling 

depressed and that she wanted to die.  Patient has been monitored both the main 

ED and in J pod for over 24 hours.  She has been attention seeking at times but 

has not engaged in any self-injurious behavior.  She has been appropriate and 

her in her interactions with staff. 


 When I met with her this morning she tells me that she had some problems with 

another member of her group home and that she feels that she needs to be 

inpatient.  She also states that she was upset because she was not allowed to 

drink anything but water.  She also tells me that her birthday is coming up 

pretty soon and she needs to be in the hospital until her birthday.  She does 

not appear to be responding to internal stimuli.  There is no christine, no 

hypomania.  No suicidal homicidal ideation.  When I begin to discuss with her a 

discharge plan she gets upset and states that she doesn't think that that's a 

good idea.


Electronic medical record is reviewed she has had an admission in January 2017.

  She was under the care Dr. Manpreet Pirce.  He diagnosed her with factitious 

disorder.  The patient is described during that admission is attention seeking 

and attempting to maintain the sick role in order to obtain attention.





Staff have contacted the patient's mother reports that the patient's 

presentation is more motivated by her wanting to get admitted to the hospital 

and is more behavioral than psychiatrically induced.





PFSH


Past Medical History


Anxiety:  Yes


Cerebral Palsy:  Yes


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


Developmental Delay:  Yes


Diminished Hearing:  No


Endocrine:  No


Genitourinary:  No


Headaches:  No


Immune Disorder:  No


Musculoskeletal:  No


Neurologic:  Yes (right hand contracture)


Psychiatric:  Yes (Hx of treatment for Schizoaffective Disorder)


Reproductive:  No


Respiratory:  No


Immunizations Current:  Yes


Migraines:  No


Pancreatitis:  Yes


Schizophrenia:  Yes (HEARING HALLUCINATION SINCE 10 YEARS OLD )


Tetanus Vaccination:  > 5 Years


Influenza Vaccination:  Yes


Pregnant?:  Not Pregnant





Past Surgical History


Cholecystectomy:  Yes


Thoracic Surgery:  Yes (G-TUBE INSERTION AND REMOVAL)


Other Surgery:  Yes





Psychiatric History


Psychiatric History


Hx Psychiatric Treatment:  


 Last inpatient admission was Jan 13-20, 2017 for adjustment disorders.


History of Inpatient Treatment:  Yes


Guns or firearms in home:  No





Social History


Single female who lives in a group home.  Never .


Hx Alcohol Use:  No


Hx Tobacco Use:  No


Hx Substance Use:  No


Hx of Substance Use Treatment:  No





Family Psychiatric History


Negative





Allergies-Medications


(Allergen,Severity, Reaction):  


Coded Allergies:  


     Fish Containing Products (Unverified  Allergy, Severe, Anaphylaxis, 9/23/17

)


     shrimp (Verified  Allergy, Intermediate, 9/23/17)


Reported Meds & Prescriptions





Reported Meds & Active Scripts


Active


Zyprexa Zydis (Olanzapine) 20 Mg Tab 20 Mg SL HS


Lithium Carbonate 300 Mg Cap 300 Mg PO 3 HS


Ativan (Lorazepam) 0.5 Mg Tab 0.5 Mg PO BID


Lamictal (Lamotrigine) 200 Mg Tab 200 Mg PO BID


Levothyroxine (Levothyroxine Sodium) 88 Mcg Tab 88 Mcg PO DAILY


Polyethylene Glycol 3350 (Polyethylene Glycol 3350 (Bulk) 1 Gra Gra 3,350 Meq 

PO DAILY 30 Days


Reported


Depo-Provera Inj (Medroxyprogesterone Inj) 150 Mg/Ml Inj 150 Mg IM Q90D


D 1000 (Cholecalciferol) 1,000 Unit Cap   


Prevident 5000 Sensitive 1.1-5 % (Sodium Fluoride-Potassium Nitr) 1 Pst Pst 

Unknown Dose PO HS


Clindamycin Topical (Clindamycin Phosphate) 1% Gel 1 Applic TOPICAL BID





Review of Systems


ROS Limitations:  Poor Historian





Mental Status Examination


Appearance:  Appropriate (dressed in [facility] [place])


Consciousness:  Alert


Orientation:  x4


Motor Activity:  Abnormal gait


Speech:  Unremarkable


Language:  Adequate


Fund of Knowledge:  Adequate (to poor)


Attention and Concentration:  Adequate


Memory:  Unremarkable


Mood:  Appropriate


Affect:  Appropriate


Thought Process & Associations:  Intact


Thought Content:  Appropriate


Hallucination Type:  None


Delusion Type:  None


Suicidal Ideation:  No


Suicidal Plan:  No


Suicidal Intention:  No


Homicidal Ideation:  No


Homicidal Plan:  No


Homicidal Intention:  No


Insight:  Poor


Judgment:  Impulsive





MDM


Medical Decision Making


Medical Record Reviewed:  Yes


Assessment/Plan


26-year-old female with history of intellectual disability, adjustment disorder

, record history of schizophrenia, as well as factitious disorder who presents 

to the emergency room under Baker act initiated by Police Department. 


The Sweet act alleges that they responded to a call  in reference to a mentally 

ill person. The  officer observed the patient sitting next to the side of the 

road next to her counselor.  The counselor reported that the patient had jumped 

out of their van while transporting her back to her group room.  Patient also 

began to bang her head against the sidewalk when in the presence of the 

officers.  Patient reported to ED staff that she was having a crisis, feeling 

depressed and that she wanted to die.  Patient has been monitored both the main 

ED and in J pod for over 24 hours.  She has been attention seeking at times but 

has not engaged in any self-injurious behavior.  She has been appropriate and 

her in her interactions with staff.  Patient does not meet criteria for the 

Sweet act.  There is no evidence of any unstable mental illness as described 

under the Baker act.  Appears to be engaging in attention seeking behavior 

completely under her control.


The Sweet act is lifted.  Return to group home.


Psychiatrically clear for discharge from ED





Orders





 Orders


Complete Blood Count With Diff (11/17/17 13:33)


Basic Metabolic Panel (Bmp) (11/17/17 13:33)


Urinalysis - C+S If Indicated (11/17/17 13:33)


Ed Urine Pregnancytest Poc (11/17/17 13:33)


Psych Screen (11/17/17 13:33)


Drug Screen, Random Urine (11/17/17 13:33)


Alcohol (Ethanol) (11/17/17 13:33)


Urine Culture (11/17/17 13:30)


Fluconazole (Diflucan) (11/17/17 16:45)


Diet Regular Basic (11/18/17 Breakfast)


Diphenhydramine (Benadryl) (11/18/17 08:30)


Diet Regular Basic (11/18/17 Lunch)





Results





Vital Signs








  Date Time  Temp Pulse Resp B/P (MAP) Pulse Ox O2 Delivery O2 Flow Rate FiO2


 


11/18/17 11:12 97.4 81 17 130/71 (90) 95 Room Air  


 


11/18/17 06:24  92 18 125/78 (94) 95 Room Air  


 


11/18/17 02:44  68 16 128/64 (85) 97 Room Air  


 


11/17/17 23:32 98.2 100 17 147/88 (107) 96   


 


11/17/17 19:24  83  159/94 (115)    


 


11/17/17 19:23  94  178/116 (136) 100 Room Air  


 


11/17/17 13:56  96 20     


 


11/17/17 12:40 98.4 98 16 145/97 (113) 98   











Laboratory Tests








Test


  11/17/17


13:30 11/17/17


13:33 11/17/17


14:35


 


Urine Color LIGHT-YELLOW   


 


Urine Turbidity CLEAR   


 


Urine pH 6.5   


 


Urine Specific Gravity 1.004   


 


Urine Protein NEG   


 


Urine Glucose (UA) NEG   


 


Urine Ketones NEG   


 


Urine Occult Blood NEG   


 


Urine Nitrite NEG   


 


Urine Bilirubin NEG   


 


Urine Urobilinogen LESS THAN 2.0   


 


Urine Leukocyte Esterase TRACE   


 


Urine RBC 0-3   


 


Urine WBC 0-2   


 


Urine Squamous Epithelial


Cells 0-5 


  


  


 


 


Urine Bacteria MOD   


 


Microscopic Urinalysis Comment


  CULTURE


INDICATED 


  


 


 


Urine Opiates Screen  NEG  


 


Urine Barbiturates Screen  NEG  


 


Urine Amphetamines Screen  NEG  


 


Urine Benzodiazepines Screen  NEG  


 


Urine Cocaine Screen  NEG  


 


Urine Cannabinoids Screen  NEG  


 


White Blood Count   12.8 


 


Red Blood Count   4.98 


 


Hemoglobin   15.2 


 


Hematocrit   44.7 


 


Mean Corpuscular Volume   89.7 


 


Mean Corpuscular Hemoglobin   30.5 


 


Mean Corpuscular Hemoglobin


Concent 


  


  34.0 


 


 


Red Cell Distribution Width   13.8 


 


Platelet Count   363 


 


Mean Platelet Volume   6.7 


 


Neutrophils (%) (Auto)   82.5 


 


Lymphocytes (%) (Auto)   12.5 


 


Monocytes (%) (Auto)   4.5 


 


Eosinophils (%) (Auto)   0.2 


 


Basophils (%) (Auto)   0.3 


 


Neutrophils # (Auto)   10.6 


 


Lymphocytes # (Auto)   1.6 


 


Monocytes # (Auto)   0.6 


 


Eosinophils # (Auto)   0.0 


 


Basophils # (Auto)   0.0 


 


CBC Comment   DIFF FINAL 


 


Differential Comment    


 


Blood Urea Nitrogen   11 


 


Creatinine   1.02 


 


Random Glucose   81 


 


Calcium Level   9.9 


 


Sodium Level   144 


 


Potassium Level   3.8 


 


Chloride Level   109 


 


Carbon Dioxide Level   27.6 


 


Anion Gap   7 


 


Estimat Glomerular Filtration


Rate 


  


  66 


 


 


Ethyl Alcohol Level   LESS THAN 3 














 Date/Time


Source Procedure


Growth Status


 


 


 11/17/17 13:30


Urine Clean Catch Urine Culture


Pending Worksheet











Diagnosis





 Primary Impression:  


 Adjustment disorder


Psychiatrically Cleared:  Yes


Disposition:  01 DISCHARGE HOME


Condition:  Stable





Problem Qualifiers








 Primary Impression:  


 Adjustment disorder


 Qualified Codes:  F43.25 - Adjustment disorder with mixed disturbance of 

emotions and conduct








Margot Hurley Nov 18, 2017 12:38

## 2017-11-18 NOTE — PD
Physical Exam


Date Seen by Provider:  Nov 18, 2017


Time Seen by Provider:  13:30


Narrative


26-year-old female presents emergency department as a Baker act.  Patient has 

subsequently been medically cleared, psychiatrically cleared and a Baker act 

has been lifted.  I was asked to disposition the patient.





Data


Data


Last Documented VS





Vital Signs








  Date Time  Temp Pulse Resp B/P (MAP) Pulse Ox O2 Delivery O2 Flow Rate FiO2


 


11/18/17 13:15        


 


11/18/17 11:12 97.4 81 17  95 Room Air  








Orders





 Orders


Complete Blood Count With Diff (11/17/17 13:33)


Basic Metabolic Panel (Bmp) (11/17/17 13:33)


Urinalysis - C+S If Indicated (11/17/17 13:33)


Ed Urine Pregnancytest Poc (11/17/17 13:33)


Psych Screen (11/17/17 13:33)


Drug Screen, Random Urine (11/17/17 13:33)


Alcohol (Ethanol) (11/17/17 13:33)


Urine Culture (11/17/17 13:30)


Fluconazole (Diflucan) (11/17/17 16:45)


Diet Regular Basic (11/18/17 Breakfast)


Diphenhydramine (Benadryl) (11/18/17 08:30)


Diet Regular Basic (11/18/17 Lunch)


Ed Discharge Order (11/18/17 13:16)





Labs





Laboratory Tests








Test


  11/17/17


13:30 11/17/17


13:33 11/17/17


14:35


 


Urine Color LIGHT-YELLOW   


 


Urine Turbidity CLEAR   


 


Urine pH 6.5   


 


Urine Specific Gravity 1.004   


 


Urine Protein NEG mg/dL   


 


Urine Glucose (UA) NEG mg/dL   


 


Urine Ketones NEG mg/dL   


 


Urine Occult Blood NEG   


 


Urine Nitrite NEG   


 


Urine Bilirubin NEG   


 


Urine Urobilinogen


  LESS THAN 2.0


MG/DL 


  


 


 


Urine Leukocyte Esterase TRACE   


 


Urine RBC 0-3 /hpf   


 


Urine WBC 0-2 /hpf   


 


Urine Squamous Epithelial


Cells 0-5 /hpf 


  


  


 


 


Urine Bacteria MOD /hpf   


 


Microscopic Urinalysis Comment


  CULTURE


INDICATED 


  


 


 


Urine Opiates Screen  NEG  


 


Urine Barbiturates Screen  NEG  


 


Urine Amphetamines Screen  NEG  


 


Urine Benzodiazepines Screen  NEG  


 


Urine Cocaine Screen  NEG  


 


Urine Cannabinoids Screen  NEG  


 


White Blood Count   12.8 TH/MM3 


 


Red Blood Count   4.98 MIL/MM3 


 


Hemoglobin   15.2 GM/DL 


 


Hematocrit   44.7 % 


 


Mean Corpuscular Volume   89.7 FL 


 


Mean Corpuscular Hemoglobin   30.5 PG 


 


Mean Corpuscular Hemoglobin


Concent 


  


  34.0 % 


 


 


Red Cell Distribution Width   13.8 % 


 


Platelet Count   363 TH/MM3 


 


Mean Platelet Volume   6.7 FL 


 


Neutrophils (%) (Auto)   82.5 % 


 


Lymphocytes (%) (Auto)   12.5 % 


 


Monocytes (%) (Auto)   4.5 % 


 


Eosinophils (%) (Auto)   0.2 % 


 


Basophils (%) (Auto)   0.3 % 


 


Neutrophils # (Auto)   10.6 TH/MM3 


 


Lymphocytes # (Auto)   1.6 TH/MM3 


 


Monocytes # (Auto)   0.6 TH/MM3 


 


Eosinophils # (Auto)   0.0 TH/MM3 


 


Basophils # (Auto)   0.0 TH/MM3 


 


CBC Comment   DIFF FINAL 


 


Differential Comment    


 


Blood Urea Nitrogen   11 MG/DL 


 


Creatinine   1.02 MG/DL 


 


Random Glucose   81 MG/DL 


 


Calcium Level   9.9 MG/DL 


 


Sodium Level   144 MEQ/L 


 


Potassium Level   3.8 MEQ/L 


 


Chloride Level   109 MEQ/L 


 


Carbon Dioxide Level   27.6 MEQ/L 


 


Anion Gap   7 MEQ/L 


 


Estimat Glomerular Filtration


Rate 


  


  66 ML/MIN 


 


 


Ethyl Alcohol Level


  


  


  LESS THAN 3


MG/DL











MDM


Supervised Visit with NICANOR:  Yes


Differential Diagnosis


Depression versus suicidal ideation versus anxiety versus adjustment disorder 

versus mood disorder versus bipolar disorder versus schizophrenia versus 

paranoid disorder versus psychosis versus substance abuse versus alcohol abuse 

versus alcohol induced psychosis versus homicidality addition versus cutting 

versus personality disorder


Narrative Course


26-year-old female with a history of cognitive delay and mental retardation 

presents emergency department for evaluation as a Sweet act.  Patient was 

subsequently cleared medically and psychiatrically and a Baker act has been 

lifted.  He she will be discharged back to her group home.


Diagnosis





 Primary Impression:  


 Adjustment disorder


 Qualified Codes:  F43.25 - Adjustment disorder with mixed disturbance of 

emotions and conduct


Patient Instructions:  General Instructions, Medical Clearance for Psychiatric 

Care (ED)


Disposition:  01 DISCHARGE HOME


Condition:  Stable











Sara Almaraz MAYDA Nov 18, 2017 13:16

## 2018-02-26 ENCOUNTER — HOSPITAL ENCOUNTER (OUTPATIENT)
Dept: HOSPITAL 17 - HCAV | Age: 28
End: 2018-02-26
Payer: COMMERCIAL

## 2018-02-26 DIAGNOSIS — R00.0: ICD-10-CM

## 2018-02-26 DIAGNOSIS — K30: Primary | ICD-10-CM

## 2018-02-26 PROCEDURE — 93005 ELECTROCARDIOGRAM TRACING: CPT

## 2018-02-27 NOTE — EKG
Date Performed: 02/26/2018       Time Performed: 11:25:26

 

PTAGE:      27 years

 

EKG:      Sinus tachycardia Normal ECG except for rate 

 

NO PREVIOUS TRACING            

 

DOCTOR:   Braden Chapa  Interpretating Date/Time  02/27/2018 22:52:03

## 2018-03-26 ENCOUNTER — HOSPITAL ENCOUNTER (EMERGENCY)
Dept: HOSPITAL 17 - NEPC | Age: 28
LOS: 1 days | Discharge: HOME | End: 2018-03-27
Payer: COMMERCIAL

## 2018-03-26 VITALS
RESPIRATION RATE: 18 BRPM | TEMPERATURE: 96.9 F | SYSTOLIC BLOOD PRESSURE: 156 MMHG | DIASTOLIC BLOOD PRESSURE: 80 MMHG | OXYGEN SATURATION: 99 % | HEART RATE: 87 BPM

## 2018-03-26 DIAGNOSIS — Z86.73: ICD-10-CM

## 2018-03-26 DIAGNOSIS — F20.0: ICD-10-CM

## 2018-03-26 DIAGNOSIS — F34.81: Primary | ICD-10-CM

## 2018-03-26 DIAGNOSIS — G80.9: ICD-10-CM

## 2018-03-26 DIAGNOSIS — F43.21: ICD-10-CM

## 2018-03-26 LAB
BACTERIA #/AREA URNS HPF: (no result) /HPF
BASOPHILS # BLD AUTO: 0.1 TH/MM3 (ref 0–0.2)
BASOPHILS NFR BLD: 0.5 % (ref 0–2)
COLOR UR: (no result)
EOSINOPHIL # BLD: 0.1 TH/MM3 (ref 0–0.4)
EOSINOPHIL NFR BLD: 0.5 % (ref 0–4)
ERYTHROCYTE [DISTWIDTH] IN BLOOD BY AUTOMATED COUNT: 13.5 % (ref 11.6–17.2)
GLUCOSE UR STRIP-MCNC: (no result) MG/DL
HCT VFR BLD CALC: 41 % (ref 35–46)
HGB BLD-MCNC: 14.1 GM/DL (ref 11.6–15.3)
HGB UR QL STRIP: (no result)
KETONES UR STRIP-MCNC: (no result) MG/DL
LYMPHOCYTES # BLD AUTO: 3.2 TH/MM3 (ref 1–4.8)
LYMPHOCYTES NFR BLD AUTO: 23.2 % (ref 9–44)
MCH RBC QN AUTO: 30 PG (ref 27–34)
MCHC RBC AUTO-ENTMCNC: 34.5 % (ref 32–36)
MCV RBC AUTO: 87 FL (ref 80–100)
MONOCYTE #: 0.6 TH/MM3 (ref 0–0.9)
MONOCYTES NFR BLD: 4.6 % (ref 0–8)
NEUTROPHILS # BLD AUTO: 9.7 TH/MM3 (ref 1.8–7.7)
NEUTROPHILS NFR BLD AUTO: 71.2 % (ref 16–70)
NITRITE UR QL STRIP: (no result)
PLATELET # BLD: 365 TH/MM3 (ref 150–450)
PMV BLD AUTO: 6.7 FL (ref 7–11)
RBC # BLD AUTO: 4.72 MIL/MM3 (ref 4–5.3)
SP GR UR STRIP: 1 (ref 1–1.03)
SQUAMOUS #/AREA URNS HPF: <1 /HPF (ref 0–5)
URINE LEUKOCYTE ESTERASE: (no result)
WBC # BLD AUTO: 13.6 TH/MM3 (ref 4–11)

## 2018-03-26 PROCEDURE — 81001 URINALYSIS AUTO W/SCOPE: CPT

## 2018-03-26 PROCEDURE — 99284 EMERGENCY DEPT VISIT MOD MDM: CPT

## 2018-03-26 PROCEDURE — 80307 DRUG TEST PRSMV CHEM ANLYZR: CPT

## 2018-03-26 PROCEDURE — 80178 ASSAY OF LITHIUM: CPT

## 2018-03-26 PROCEDURE — 80053 COMPREHEN METABOLIC PANEL: CPT

## 2018-03-26 PROCEDURE — 85025 COMPLETE CBC W/AUTO DIFF WBC: CPT

## 2018-03-26 PROCEDURE — 84703 CHORIONIC GONADOTROPIN ASSAY: CPT

## 2018-03-26 NOTE — PD
HPI


Chief Complaint:  Psychiatric Symptoms


Time Seen by Provider:  23:31


Travel History


International Travel<30 days:  No


Contact w/Intl Traveler<30days:  No


Traveled to known affect area:  No





History of Present Illness


HPI


The patient is a 27-year-old  female who presents to the emergency 

department as a Sweet act.  The patient has a history of cerebral palsy and a 

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  The patient states she was under 

stress earlier today because she learned her grandfather, who lives in [place] 

[place], has cancer in his blood.  The patient states she was stressed out and 

things were not going well at the group home.  The patient states she tried to 

run away 3 times and had thoughts of harming herself.  The patient states she 

bit her left hand, has a history of biting in an attempt to harm herself.  The 

patient does note that she has a history of schizoaffective disorder needs her 

medications.  She complains of being "stressed out", but denies any acute chest 

pain, shortness of breath, nausea, vomiting, or abdominal pain.  She does have 

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 

upper extremity and right lower extremity.





[facility]


Past Medical History


Anxiety:  Yes


Cerebral Palsy:  Yes


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


Developmental Delay:  Yes


Diminished Hearing:  No


Endocrine:  No


Gastrointestinal Disorders:  No


Genitourinary:  No


Headaches:  No


Immune Disorder:  No


Musculoskeletal:  No


Neurologic:  Yes (right hand contracture)


Psychiatric:  Yes (Hx of treatment for Schizoaffective Disorder)


Reproductive:  No


Respiratory:  No


Immunizations Current:  Yes


Migraines:  No


Pancreatitis:  Yes


Schizophrenia:  Yes (HEARING HALLUCINATION SINCE 10 YEARS OLD )


Seizures:  Yes (see EMR)


Pregnant?:  Unknown





Past Surgical History


Cholecystectomy:  Yes


Thoracic Surgery:  Yes (G-TUBE INSERTION AND REMOVAL)


Other Surgery:  Yes





Social History


Alcohol Use:  No


Tobacco Use:  No


Substance Use:  No





Allergies-Medications


(Allergen,Severity, Reaction):  


Coded Allergies:  


     Fish Containing Products (Unverified  Allergy, Severe, Anaphylaxis, 3/26/18

)


     shrimp (Verified  Allergy, Intermediate, 3/26/18)


Reported Meds & Prescriptions





Reported Meds & Active Scripts


Active


Zyprexa Zydis (Olanzapine) 20 Mg Tab 20 Mg SL HS


Ativan (Lorazepam) 0.5 Mg Tab 0.5 Mg PO BID


Lamictal (Lamotrigine) 200 Mg Tab 200 Mg PO BID


Levothyroxine (Levothyroxine Sodium) 88 Mcg Tab 88 Mcg PO DAILY


Polyethylene Glycol 3350 (Polyethylene Glycol 3350 (Bulk) 1 Gra Gra 3,350 Meq 

PO DAILY 30 Days


Reported


Aripiprazole 2 Mg Tab 2 Mg PO HS


Docusate Sodium 100 Mg Cap 200 Mg PO DAILY


Lithium Carbonate 600 Mg Cap 600 Mg PO HS


Lithium Carbonate 300 Mg Cap 300 Mg PO DAILY


Depo-Provera Inj (Medroxyprogesterone Inj) 150 Mg/Ml Inj 150 Mg IM Q90D


D 1000 (Cholecalciferol) 1,000 Unit Cap   


Prevident 5000 Sensitive 1.1-5 % (Sodium Fluoride-Potassium Nitr) 1 Pst Pst 

Unknown Dose PO HS


Clindamycin Topical (Clindamycin Phosphate) 1% Gel 1 Applic TOPICAL BID








Review of Systems


Except as stated in HPI:  all other systems reviewed are Neg


General / Constitutional:  No: Fever


Cardiovascular:  No: Chest Pain or Discomfort


Respiratory:  No: Shortness of Breath


Gastrointestinal:  No: Nausea, Vomiting, Abdominal Pain


Musculoskeletal:  Positive: Limited ROM


Neurologic:  Positive: Other (As noted in the history of present illness)


Psychiatric:  Positive: Depression, Disorder of Thought, Mood Disorder





Physical Exam


Narrative


GENERAL: Awake, alert, pleasant 27-year-old female who appears her stated age 

and is in no acute respiratory distress.


SKIN: Focused skin assessment warm/dry.


HEAD: Atraumatic. Normocephalic. 


EYES: Pupils equal and round.  3 mm bilateral and reactive.


ENT: No nasal bleeding or discharge.  Mucous membranes pink and moist.


NECK: Trachea midline. No JVD. 


CARDIOVASCULAR: Regular rate and rhythm.  No murmur appreciated.


RESPIRATORY: No accessory muscle use. Clear to auscultation. Breath sounds 

equal bilaterally. 


GASTROINTESTINAL: Abdomen soft, non-tender, nondistended.  No rebound 

tenderness.


MUSCULOSKELETAL: Patient has atrophy of the right hand with limited ability to 

extend the right elbow.  The patient's right lower extremity is in a brace.


NEUROLOGICAL: Awake and alert. No obvious cranial nerve deficits.  Motor 

grossly within normal limits. Normal speech.


PSYCHIATRIC: Appropriate mood and affect; insight and judgment normal.





Data


Data


Last Documented VS





Vital Signs








  Date Time  Temp Pulse Resp B/P (MAP) Pulse Ox O2 Delivery O2 Flow Rate FiO2


 


3/26/18 20:40 96.9 87 18 156/80 (105) 99 Room Air  








Orders





 Orders


Complete Blood Count With Diff (3/26/18 21:29)


Comprehensive Metabolic Panel (3/26/18 21:29)


Urinalysis - C+S If Indicated (3/26/18 21:29)


Ed Urine Pregnancytest Poc (3/26/18 21:29)


Psych Screen (3/26/18 21:29)


Lithium (Li) (3/26/18 21:29)


Diet Regular Basic (3/27/18 Breakfast)


Drug Screen, Random Urine (3/26/18 21:29)


Lamotrigine (Lamictal) (3/26/18 23:45)


Aripiprazole (Abilify) (3/26/18 23:45)


Lorazepam (Ativan) (3/26/18 23:45)


Olanzapine (Zyprexa) (3/26/18 23:45)


Lithium Carbonate (Lithium Carbonate) (3/27/18 00:45)





Labs





Laboratory Tests








Test


  3/26/18


22:10 3/26/18


23:40


 


Urine Color LIGHT-YELLOW  


 


Urine Turbidity CLEAR  


 


Urine pH 6.0  


 


Urine Specific Gravity 1.003  


 


Urine Protein NEG mg/dL  


 


Urine Glucose (UA) NEG mg/dL  


 


Urine Ketones NEG mg/dL  


 


Urine Occult Blood NEG  


 


Urine Nitrite NEG  


 


Urine Bilirubin NEG  


 


Urine Urobilinogen


  LESS THAN 2.0


MG/DL 


 


 


Urine Leukocyte Esterase NEG  


 


Urine WBC


  LESS THAN 1


/hpf 


 


 


Urine Squamous Epithelial


Cells <1 /hpf 


  


 


 


Urine Bacteria RARE /hpf  


 


Microscopic Urinalysis Comment


  CULT NOT


INDICATED 


 


 


Urine Opiates Screen NEG  


 


Urine Barbiturates Screen NEG  


 


Urine Amphetamines Screen NEG  


 


Urine Benzodiazepines Screen NEG  


 


Urine Cocaine Screen NEG  


 


Urine Cannabinoids Screen NEG  


 


White Blood Count  13.6 TH/MM3 


 


Red Blood Count  4.72 MIL/MM3 


 


Hemoglobin  14.1 GM/DL 


 


Hematocrit  41.0 % 


 


Mean Corpuscular Volume  87.0 FL 


 


Mean Corpuscular Hemoglobin  30.0 PG 


 


Mean Corpuscular Hemoglobin


Concent 


  34.5 % 


 


 


Red Cell Distribution Width  13.5 % 


 


Platelet Count  365 TH/MM3 


 


Mean Platelet Volume  6.7 FL 


 


Neutrophils (%) (Auto)  71.2 % 


 


Lymphocytes (%) (Auto)  23.2 % 


 


Monocytes (%) (Auto)  4.6 % 


 


Eosinophils (%) (Auto)  0.5 % 


 


Basophils (%) (Auto)  0.5 % 


 


Neutrophils # (Auto)  9.7 TH/MM3 


 


Lymphocytes # (Auto)  3.2 TH/MM3 


 


Monocytes # (Auto)  0.6 TH/MM3 


 


Eosinophils # (Auto)  0.1 TH/MM3 


 


Basophils # (Auto)  0.1 TH/MM3 


 


CBC Comment  DIFF FINAL 


 


Differential Comment   


 


Blood Urea Nitrogen  10 MG/DL 


 


Creatinine  1.03 MG/DL 


 


Random Glucose  120 MG/DL 


 


Total Protein  7.4 GM/DL 


 


Albumin  4.0 GM/DL 


 


Calcium Level  9.1 MG/DL 


 


Alkaline Phosphatase  135 U/L 


 


Aspartate Amino Transf


(AST/SGOT) 


  13 U/L 


 


 


Alanine Aminotransferase


(ALT/SGPT) 


  19 U/L 


 


 


Total Bilirubin  0.5 MG/DL 


 


Sodium Level  143 MEQ/L 


 


Potassium Level  3.6 MEQ/L 


 


Chloride Level  110 MEQ/L 


 


Carbon Dioxide Level  24.0 MEQ/L 


 


Anion Gap  9 MEQ/L 


 


Estimat Glomerular Filtration


Rate 


  65 ML/MIN 


 


 


Lithium Level  0.4 MEQ/L 











[facility]


Medical Decision Making


Medical Screen Exam Complete:  Yes


Emergency Medical Condition:  Yes


Medical Record Reviewed:  Yes


Interpretation(s)





Laboratory Tests








Test


  3/26/18


22:10 3/26/18


23:40


 


Urine Color LIGHT-YELLOW  


 


Urine Turbidity CLEAR  


 


Urine pH 6.0  


 


Urine Specific Gravity 1.003  


 


Urine Protein NEG mg/dL  


 


Urine Glucose (UA) NEG mg/dL  


 


Urine Ketones NEG mg/dL  


 


Urine Occult Blood NEG  


 


Urine Nitrite NEG  


 


Urine Bilirubin NEG  


 


Urine Urobilinogen


  LESS THAN 2.0


MG/DL 


 


 


Urine Leukocyte Esterase NEG  


 


Urine WBC


  LESS THAN 1


/hpf 


 


 


Urine Squamous Epithelial


Cells <1 /hpf 


  


 


 


Urine Bacteria RARE /hpf  


 


Microscopic Urinalysis Comment


  CULT NOT


INDICATED 


 


 


Urine Opiates Screen NEG  


 


Urine Barbiturates Screen NEG  


 


Urine Amphetamines Screen NEG  


 


Urine Benzodiazepines Screen NEG  


 


Urine Cocaine Screen NEG  


 


Urine Cannabinoids Screen NEG  


 


White Blood Count  13.6 TH/MM3 


 


Red Blood Count  4.72 MIL/MM3 


 


Hemoglobin  14.1 GM/DL 


 


Hematocrit  41.0 % 


 


Mean Corpuscular Volume  87.0 FL 


 


Mean Corpuscular Hemoglobin  30.0 PG 


 


Mean Corpuscular Hemoglobin


Concent 


  34.5 % 


 


 


Red Cell Distribution Width  13.5 % 


 


Platelet Count  365 TH/MM3 


 


Mean Platelet Volume  6.7 FL 


 


Neutrophils (%) (Auto)  71.2 % 


 


Lymphocytes (%) (Auto)  23.2 % 


 


Monocytes (%) (Auto)  4.6 % 


 


Eosinophils (%) (Auto)  0.5 % 


 


Basophils (%) (Auto)  0.5 % 


 


Neutrophils # (Auto)  9.7 TH/MM3 


 


Lymphocytes # (Auto)  3.2 TH/MM3 


 


Monocytes # (Auto)  0.6 TH/MM3 


 


Eosinophils # (Auto)  0.1 TH/MM3 


 


Basophils # (Auto)  0.1 TH/MM3 


 


CBC Comment  DIFF FINAL 


 


Differential Comment   


 


Blood Urea Nitrogen  10 MG/DL 


 


Creatinine  1.03 MG/DL 


 


Random Glucose  120 MG/DL 


 


Total Protein  7.4 GM/DL 


 


Albumin  4.0 GM/DL 


 


Calcium Level  9.1 MG/DL 


 


Alkaline Phosphatase  135 U/L 


 


Aspartate Amino Transf


(AST/SGOT) 


  13 U/L 


 


 


Alanine Aminotransferase


(ALT/SGPT) 


  19 U/L 


 


 


Total Bilirubin  0.5 MG/DL 


 


Sodium Level  143 MEQ/L 


 


Potassium Level  3.6 MEQ/L 


 


Chloride Level  110 MEQ/L 


 


Carbon Dioxide Level  24.0 MEQ/L 


 


Anion Gap  9 MEQ/L 


 


Estimat Glomerular Filtration


Rate 


  65 ML/MIN 


 


 


Lithium Level  0.4 MEQ/L 








Differential Diagnosis


Differential diagnosis includes schizoaffective disorder, bipolar affective 

disorder, depressive disorder NOS, l subtherapeutic lithium level, 

supratherapeutic lithium level, mood disorder NOS.


Narrative Course


Labs were drawn and sent.  Psychiatric screen was ordered.  The patient was 

administered her nighttime dose of medications.  Lithium level was less than 0.4

, therefore, patient was administered her nighttime dose of lithium 600 mg 

orally.  Labs were noted, patient is medically cleared to be evaluated by 

psychiatry.  Disposition as per psych.





Diagnosis





 Primary Impression:  


 Disruptive mood dysregulation disorder


 Additional Impressions:  


 schizophrenic disorder chronic paranoid type


 History of cerebral palsy


Condition:  Stable











Tomas Briceño MD Mar 26, 2018 23:40

## 2018-03-27 LAB
ALBUMIN SERPL-MCNC: 4 GM/DL (ref 3.4–5)
ALP SERPL-CCNC: 135 U/L (ref 45–117)
ALT SERPL-CCNC: 19 U/L (ref 10–53)
AST SERPL-CCNC: 13 U/L (ref 15–37)
BILIRUB SERPL-MCNC: 0.5 MG/DL (ref 0.2–1)
BUN SERPL-MCNC: 10 MG/DL (ref 7–18)
CALCIUM SERPL-MCNC: 9.1 MG/DL (ref 8.5–10.1)
CHLORIDE SERPL-SCNC: 110 MEQ/L (ref 98–107)
CREAT SERPL-MCNC: 1.03 MG/DL (ref 0.5–1)
GFR SERPLBLD BASED ON 1.73 SQ M-ARVRAT: 65 ML/MIN (ref 89–?)
GLUCOSE SERPL-MCNC: 120 MG/DL (ref 74–106)
HCO3 BLD-SCNC: 24 MEQ/L (ref 21–32)
PROT SERPL-MCNC: 7.4 GM/DL (ref 6.4–8.2)
SODIUM SERPL-SCNC: 143 MEQ/L (ref 136–145)

## 2018-03-27 NOTE — PD
__________________________________________________





History of Present Illness


Chief Complaint:  Adjustment disorder


Time Seen by Provider:  09:00


Travel History


International Travel<30 Days:  No


Contact w/Intl Traveler<30days:  No


Known affected area:  No





Legal Status


Legal Status:  Baker Act


Sweet Act Signed By:  Irina GOODRICH


History of Present Illness:


This is a 27-year-old, single,  female who presents under Baker act by 

police for running away from a group home and biting herself.  Patient is well-

known to this facility has had multiple inpatient admissions.





Reviewed electronic medical record, labs, and discussed case with staff.  Her 

lithium level is slightly sub-therapeutic at 0.4.  Evaluated patient in her 

room in the main ED.  Patient is awake, alert, and oriented.  Her speech is 

clear, logical, and organized.  She does not appear to be in any distress at 

this time.  She denies suicidal ideation, homicidal ideation, visual or 

auditory hallucinations.  Patient reported that she was "stressed".  When asked 

why she was here she stated "for running away from my home 3 times".  Spoke 

with patient's mother who is her legal guardian, and she advises that patient 

is a "frequent flyer" to this facility.  Her mother advises that this she feels 

this is all behavioral.  Additionally, she states that patient made 2 attempts 

to come to this facility yesterday with the second one being successful.  

Mother reports that patient's group home is ready to come pick her up and she 

would like her discharged to their care.





[facility]


Past Medical History


Anxiety:  Yes


Cerebral Palsy:  Yes


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


Developmental Delay:  Yes


Diminished Hearing:  No


Endocrine:  No


Gastrointestinal Disorders:  No


Genitourinary:  No


Headaches:  No


Immune Disorder:  No


Musculoskeletal:  No


Neurologic:  Yes (right hand contracture)


Psychiatric:  Yes (Hx of treatment for Schizoaffective Disorder)


Reproductive:  No


Respiratory:  No


Immunizations Current:  Yes


Migraines:  No


Pancreatitis:  Yes


Schizophrenia:  Yes (HEARING HALLUCINATION SINCE 10 YEARS OLD )


Seizures:  Yes (see EMR)


Pregnant?:  Unknown





Past Surgical History


Cholecystectomy:  Yes


Thoracic Surgery:  Yes (G-TUBE INSERTION AND REMOVAL)


Other Surgery:  Yes





Psychiatric History


Psychiatric History


Patient is well-known to this facility with multiple admissions.


Hx Psychiatric Treatment:  


 Last inpatient admission was Jan 13-20, 2017 for adjustment disorders.


History of Inpatient Treatment:  Yes





Social History


Lives in a behavioral group home.


Hx Alcohol Use:  No


Hx Tobacco Use:  No


Hx Substance Use:  No


Hx of Substance Use Treatment:  No





Allergies-Medications


(Allergen,Severity, Reaction):  


Coded Allergies:  


     Fish Containing Products (Unverified  Allergy, Severe, Anaphylaxis, 3/26/18

)


     shrimp (Verified  Allergy, Intermediate, 3/26/18)


Reported Meds & Prescriptions





Reported Meds & Active Scripts


Active


Zyprexa Zydis (Olanzapine) 20 Mg Tab 20 Mg SL HS


Ativan (Lorazepam) 0.5 Mg Tab 0.5 Mg PO BID


Lamictal (Lamotrigine) 200 Mg Tab 200 Mg PO BID


Levothyroxine (Levothyroxine Sodium) 88 Mcg Tab 88 Mcg PO DAILY


Polyethylene Glycol 3350 (Polyethylene Glycol 3350 (Bulk) 1 Gra Gra 3,350 Meq 

PO DAILY 30 Days


Reported


Aripiprazole 2 Mg Tab 2 Mg PO HS


Docusate Sodium 100 Mg Cap 200 Mg PO DAILY


Lithium Carbonate 600 Mg Cap 600 Mg PO HS


Lithium Carbonate 300 Mg Cap 300 Mg PO DAILY


Depo-Provera Inj (Medroxyprogesterone Inj) 150 Mg/Ml Inj 150 Mg IM Q90D


D 1000 (Cholecalciferol) 1,000 Unit Cap   


Prevident 5000 Sensitive 1.1-5 % (Sodium Fluoride-Potassium Nitr) 1 Pst Pst 

Unknown Dose PO HS


Clindamycin Topical (Clindamycin Phosphate) 1% Gel 1 Applic TOPICAL BID





Mental Status Examination


Appearance:  Appropriate, Well dressed/well groomed


Consciousness:  Alert


Orientation:  Person, Place, Situation


Motor Activity:  Normal gait


Speech:  Unremarkable


Language:  Adequate


Fund of Knowledge:  Inadequate


Attention and Concentration:  Adequate


Memory:  Unremarkable


Mood:  Appropriate


Affect:  Appropriate


Thought Process & Associations:  Intact


Thought Content:  Appropriate


Hallucination Type:  None


Delusion Type:  None


Suicidal Ideation:  No


Suicidal Plan:  No


Suicidal Intention:  No


Homicidal Ideation:  No


Homicidal Plan:  No


Homicidal Intention:  No


Insight:  Adequate


Judgment:  Adequate





[facility]


Medical Decision Making


Medical Record Reviewed:  Yes


Assessment/Plan


This is a 27-year-old single,  female who presents to this facility 

under Sweet act for running away from her group home.  Patient is well-known to 

this facility with multiple visits and admissions.  Evaluated patient in her 

room where she was found sitting up in a stretcher watching television.  She is 

awake, alert, and oriented.  Her speech is clear, logical, and organized.  She 

denies any thoughts of self-harm, hurting others, visual or auditory 

hallucinations.  She does report that she has a history of biting herself.  She 

does not appear delusional at this time.  Upon speaking with her mother, who 

has legal guardianship, patient will be discharged back to her group home.  The 

mother feels this is all behavioral and reports that patient makes frequent 

attempts to come to this facility.  Discussed this case with Dr. Mera, who 

also assessed the patient and agrees the patient be discharged.  He has lifted 

the Baker act and patient will be returned to her group home.





Orders





 Orders


Complete Blood Count With Diff (3/26/18 21:29)


Comprehensive Metabolic Panel (3/26/18 21:29)


Urinalysis - C+S If Indicated (3/26/18 21:29)


Ed Urine Pregnancytest Poc (3/26/18 21:29)


Psych Screen (3/26/18 21:29)


Lithium (Li) (3/26/18 21:29)


Diet Regular Basic (3/27/18 Breakfast)


Drug Screen, Random Urine (3/26/18 21:29)


Lamotrigine (Lamictal) (3/26/18 23:45)


Aripiprazole (Abilify) (3/26/18 23:45)


Lorazepam (Ativan) (3/26/18 23:45)


Olanzapine (Zyprexa) (3/26/18 23:45)


Lithium Carbonate (Lithium Carbonate) (3/27/18 00:45)





Results





Vital Signs








  Date Time  Temp Pulse Resp B/P (MAP) Pulse Ox O2 Delivery O2 Flow Rate FiO2


 


3/26/18 20:40 96.9 87 18 156/80 (105) 99 Room Air  











Laboratory Tests








Test


  3/26/18


22:10 3/26/18


23:40


 


Urine Color LIGHT-YELLOW  


 


Urine Turbidity CLEAR  


 


Urine pH 6.0  


 


Urine Specific Gravity 1.003  


 


Urine Protein NEG  


 


Urine Glucose (UA) NEG  


 


Urine Ketones NEG  


 


Urine Occult Blood NEG  


 


Urine Nitrite NEG  


 


Urine Bilirubin NEG  


 


Urine Urobilinogen LESS THAN 2.0  


 


Urine Leukocyte Esterase NEG  


 


Urine WBC LESS THAN 1  


 


Urine Squamous Epithelial


Cells <1 


  


 


 


Urine Bacteria RARE  


 


Microscopic Urinalysis Comment


  CULT NOT


INDICATED 


 


 


Urine Opiates Screen NEG  


 


Urine Barbiturates Screen NEG  


 


Urine Amphetamines Screen NEG  


 


Urine Benzodiazepines Screen NEG  


 


Urine Cocaine Screen NEG  


 


Urine Cannabinoids Screen NEG  


 


White Blood Count  13.6 


 


Red Blood Count  4.72 


 


Hemoglobin  14.1 


 


Hematocrit  41.0 


 


Mean Corpuscular Volume  87.0 


 


Mean Corpuscular Hemoglobin  30.0 


 


Mean Corpuscular Hemoglobin


Concent 


  34.5 


 


 


Red Cell Distribution Width  13.5 


 


Platelet Count  365 


 


Mean Platelet Volume  6.7 


 


Neutrophils (%) (Auto)  71.2 


 


Lymphocytes (%) (Auto)  23.2 


 


Monocytes (%) (Auto)  4.6 


 


Eosinophils (%) (Auto)  0.5 


 


Basophils (%) (Auto)  0.5 


 


Neutrophils # (Auto)  9.7 


 


Lymphocytes # (Auto)  3.2 


 


Monocytes # (Auto)  0.6 


 


Eosinophils # (Auto)  0.1 


 


Basophils # (Auto)  0.1 


 


CBC Comment  DIFF FINAL 


 


Differential Comment   


 


Blood Urea Nitrogen  10 


 


Creatinine  1.03 


 


Random Glucose  120 


 


Total Protein  7.4 


 


Albumin  4.0 


 


Calcium Level  9.1 


 


Alkaline Phosphatase  135 


 


Aspartate Amino Transf


(AST/SGOT) 


  13 


 


 


Alanine Aminotransferase


(ALT/SGPT) 


  19 


 


 


Total Bilirubin  0.5 


 


Sodium Level  143 


 


Potassium Level  3.6 


 


Chloride Level  110 


 


Carbon Dioxide Level  24.0 


 


Anion Gap  9 


 


Estimat Glomerular Filtration


Rate 


  65 


 


 


Lithium Level  0.4 











Diagnosis





 Primary Impression:  


 Adjustment disorder


Condition:  Stable











Marnie Velasquez Mar 27, 2018 09:34

## 2018-03-27 NOTE — PD.PSY.CON
Provisional Diagnosis


Admission Date





Axis I.


Adjustment disorder with depressed mood, mild to moderate intellectual 

disability, poor impulse control





History of Present Illness


Service


Psychiatry


Consult Requested By


ER


Reason for Consult


Under Baker act


Primary Care Physician


Unknown


HPI


The patient is a 27-year-old  woman, domiciled in a residential 

facility, unemployed, single, with psychiatric history schizophrenia, 

factitious disorder, of poor impulse control, anger management, mild to 

moderate intellectual disability, previous psychiatric hospitalizations, 

multiple times Sweet acted due to aggressive behavior, well known by this 

service, who presents under Baker act by police for running away from a group 

home and biting herself.  Documentation was reviewed, case discussed with 

medical staff and nursing staff.  Her lithium level is 0.4.  Evaluated patient 

in her room in the main ED.  Patient is awake, alert, and oriented.  She is 

irritable and a little bit oppositional, but redirectable.  Other than that, 

the patient is logical, coherent and relevant.  She does not appear to be in 

any distress at this time.  She denies suicidal ideation, homicidal ideation, 

visual or auditory hallucinations.  Patient reported that she was "stressed".  

When asked why she was here she stated "for running away from my home 3 times".

  Spoke with patient's mother who is her legal guardian, and she advises that 

patient is a "frequent flyer" to this facility.  Her mother advises that this 

she feels this is all behavioral.  Additionally, she states that patient made 2 

attempts to come to this facility yesterday with the second one being 

successful.  Mother reports that patient's group home is ready to come pick her 

up and she would like her discharged to their care.





Review of Systems


Endocrine:  DENIES: Abnorml menstrual pattern, Heat/cold intolerance, Polydipsia

, Polyuria, Polyphagia


Eyes:  DENIES: Blurred vision, Diplopia, Eye inflammation, Eye pain, Vision loss

, Photosensitivity, Double Vision


Ears, nose, mouth, throat:  DENIES: Tinnitus, Hearing loss, Vertigo, Nasal 

discharge, Oral lesions, Throat pain, Hoarseness, Ear Pain, Running Nose, 

Epistaxis, Sinus Pain, Toothache, Odynophagia


Respiratory:  DENIES: Apneas, Cough, Snoring, Wheezing, Hemoptysis, Sputum 

production, Shortness of breath


Cardiovascular:  DENIES: Chest pain, Palpitations, Syncope, Dyspnea on Exertion

, PND, Lower Extremity Edema, Orthopnea, Claudication


Gastrointestinal:  DENIES: Abdominal pain, Black stools, Bloody stools, 

Constipation, Diarrhea, Nausea, Vomiting, Difficulty Swallowing, Anorexia


Genitourinary:  DENIES: Abnormal vaginal bleeding, Dysmenorrhea, Dyspareunia, 

Sexual dysfunction, Urinary frequency, Urinary incontinence, Urgency, Hematuria

, Dysuria, Nocturia, Vaginal discharge


Musculoskeletal:  DENIES: Joint pain, Muscle aches, Stiffness, Joint Swelling, 

Back pain, Neck pain


Integumentary:  DENIES: Abnormal pigmentation, Pruritus, Rash, Nail changes, 

Breast masses, Breast skin changes, Nipple discharge


Hematologic/lymphatic:  DENIES: Bruising, Lymphadenopathy


Immunologic/allergic:  DENIES: Eczema, Urticaria


Neurologic:  DENIES: Abnormal gait, Headache, Localized weakness, Paresthesias, 

Seizures, Speech Problems, Tremor, Poor Balance


Psychiatric:  DENIES: Anxiety, Confusion, Mood changes, Depression, 

Hallucinations, Agitation, Suicidal Ideation, Homicidal Ideation, Delusions





Past Family Social History


Coded Allergies:  


     Fish Containing Products (Unverified  Allergy, Severe, Anaphylaxis, 3/26/18

)


     shrimp (Verified  Allergy, Intermediate, 3/26/18)


Active Scripts


Olanzapine Odt (Zyprexa Zydis) 20 Mg Tab, 20 MG SL HS for health, #30 TAB 2 

Refills


   Prov:Josh Cabral MD         1/5/17


Lorazepam (Ativan) 0.5 Mg Tab, 0.5 MG PO BID for anxiety, #60 TAB 2 Refills


   Prov:Josh Cabral MD         1/4/17


Lamotrigine (Lamictal) 200 Mg Tab, 200 MG PO BID for health, #60 TAB 2 Refills


   Prov:Josh Cabral MD         1/4/17


Levothyroxine (Levothyroxine) 88 Mcg Tab, 88 MCG PO DAILY for Thyroid, #30 TAB 

2 Refills


   Prov:Josh Cabral MD         1/4/17


Polyethylene Glycol 3350 (Bulk (Polyethylene Glycol 3350) 1 Gra Gra, 3350 MEQ 

PO DAILY for Constipation for 30 Days, BOX


   Prov:Josh Cabral MD         11/8/16


Reported Medications


Aripiprazole (Aripiprazole) 2 Mg Tab, 2 MG PO HS, #30 TAB 0 Refills


   3/26/18


Docusate Sodium (Docusate Sodium) 100 Mg Cap, 200 MG PO DAILY for Prevent 

Constipation, #60 CAP 0 Refills


   3/26/18


Lithium Carbonate (Lithium Carbonate) 600 Mg Cap, 600 MG PO HS, CAP 0 Refills


   3/26/18


Lithium Carbonate (Lithium Carbonate) 300 Mg Cap, 300 MG PO DAILY, CAP 0 Refills


   3/26/18


Medroxyprogesterone Inj (Depo-Provera Inj) 150 Mg/Ml Inj, 150 MG IM Q90D for 

Birth Control, VIAL 0 Refills


   11/17/17


Cholecalciferol (D 1000) 1,000 Unit Cap


   1/10/17


Sodium Fluoride-Potassium Nitr (Prevident 5000 Sensitive 1.1-5 %) 1 Pst Pst, PO 

HS


   1/10/17


Clindamycin Topical (Clindamycin Topical) 1% Gel, 1 APPLIC TOPICAL BID for ACNE

, #30 GM 0 Refills


   1/10/17


Discontinued Scripts


Lithium Carbonate (Lithium Carbonate) 300 Mg Cap, 300 MG PO 3 hs for health, #

90 CAP 2 Refills


   Prov:Josh Cabral MD         1/4/17





Physical Exam


Vital Signs





Vital Signs








  Date Time  Temp Pulse Resp B/P (MAP) Pulse Ox O2 Delivery O2 Flow Rate FiO2


 


3/27/18 12:05        


 


3/26/18 20:40 96.9 87 18  99 Room Air  








Lab Results











Test


  3/26/18


22:10 3/26/18


23:40


 


Urine Color LIGHT-YELLOW  


 


Urine Turbidity CLEAR  


 


Urine pH 6.0  


 


Urine Specific Gravity 1.003  


 


Urine Protein NEG mg/dL  


 


Urine Glucose (UA) NEG mg/dL  


 


Urine Ketones NEG mg/dL  


 


Urine Occult Blood NEG  


 


Urine Nitrite NEG  


 


Urine Bilirubin NEG  


 


Urine Urobilinogen


  LESS THAN 2.0


MG/DL 


 


 


Urine Leukocyte Esterase NEG  


 


Urine WBC


  LESS THAN 1


/hpf 


 


 


Urine Squamous Epithelial


Cells <1 /hpf 


  


 


 


Urine Bacteria RARE /hpf  


 


Microscopic Urinalysis Comment


  CULT NOT


INDICATED 


 


 


Urine Opiates Screen NEG  


 


Urine Barbiturates Screen NEG  


 


Urine Amphetamines Screen NEG  


 


Urine Benzodiazepines Screen NEG  


 


Urine Cocaine Screen NEG  


 


Urine Cannabinoids Screen NEG  


 


White Blood Count  13.6 TH/MM3 


 


Red Blood Count  4.72 MIL/MM3 


 


Hemoglobin  14.1 GM/DL 


 


Hematocrit  41.0 % 


 


Mean Corpuscular Volume  87.0 FL 


 


Mean Corpuscular Hemoglobin  30.0 PG 


 


Mean Corpuscular Hemoglobin


Concent 


  34.5 % 


 


 


Red Cell Distribution Width  13.5 % 


 


Platelet Count  365 TH/MM3 


 


Mean Platelet Volume  6.7 FL 


 


Neutrophils (%) (Auto)  71.2 % 


 


Lymphocytes (%) (Auto)  23.2 % 


 


Monocytes (%) (Auto)  4.6 % 


 


Eosinophils (%) (Auto)  0.5 % 


 


Basophils (%) (Auto)  0.5 % 


 


Neutrophils # (Auto)  9.7 TH/MM3 


 


Lymphocytes # (Auto)  3.2 TH/MM3 


 


Monocytes # (Auto)  0.6 TH/MM3 


 


Eosinophils # (Auto)  0.1 TH/MM3 


 


Basophils # (Auto)  0.1 TH/MM3 


 


CBC Comment  DIFF FINAL 


 


Differential Comment   


 


Blood Urea Nitrogen  10 MG/DL 


 


Creatinine  1.03 MG/DL 


 


Random Glucose  120 MG/DL 


 


Total Protein  7.4 GM/DL 


 


Albumin  4.0 GM/DL 


 


Calcium Level  9.1 MG/DL 


 


Alkaline Phosphatase  135 U/L 


 


Aspartate Amino Transf


(AST/SGOT) 


  13 U/L 


 


 


Alanine Aminotransferase


(ALT/SGPT) 


  19 U/L 


 


 


Total Bilirubin  0.5 MG/DL 


 


Sodium Level  143 MEQ/L 


 


Potassium Level  3.6 MEQ/L 


 


Chloride Level  110 MEQ/L 


 


Carbon Dioxide Level  24.0 MEQ/L 


 


Anion Gap  9 MEQ/L 


 


Estimat Glomerular Filtration


Rate 


  65 ML/MIN 


 


 


Lithium Level  0.4 MEQ/L 











Mental Status Examination


Appearance:  Appropriate, Well dressed/well groomed


Consciousness:  Alert


Orientation:  Person, Place, Situation


Motor Activity:  Normal gait


Speech:  Unremarkable


Language:  Adequate


Fund of Knowledge:  Inadequate


Attention and Concentration:  Adequate


Memory:  Unremarkable


Mood:  Appropriate


Affect:  Appropriate


Thought Process & Associations:  Intact


Thought Content:  Appropriate


Hallucination Type:  None


Delusion Type:  None


Suicidal Ideation:  No


Suicidal Plan:  No


Suicidal Intention:  No


Homicidal Ideation:  No


Homicidal Plan:  No


Homicidal Intention:  No


Insight:  Adequate


Judgment:  Adequate





Assessment & Plan


Problem List:  


(1) Adjustment disorder


ICD Codes:  F43.20 - Adjustment disorder, unspecified


Status:  Acute


Assessment & Plan:  On psychiatric evaluation today the patient is irritable, 

oppositional, but redirectable.  The patient reports anger and depression in 

the context of recent conflicts in her residential facility.  She denies 

anhedonia, denies hopelessness, denies helplessness, denies suicidal and 

homicidal ideation, denies visual and auditory hallucinations.  At this moment 

the patient seems to be at baseline.  I recognize that the patient has an 

increased risk of aggressive behavior and acting out, but she is compliant with 

her medications, and she does not meet criteria for involuntary psychiatric 

admission at this moment.  She can continue her psychiatric care as an 

outpatient.  Patient, and also her mother contacted for collateral information, 

are in agreement with plan.  Sweet act will be lifted.





Assessment & Plan


Estimated LOS:  days





Problem Qualifiers





(1) Adjustment disorder:  


Qualified Codes:  F43.21 - Adjustment disorder with depressed mood








Kwesi Abraham MD Mar 27, 2018 14:47

## 2018-03-27 NOTE — PD
Data


Data


Last Documented VS





Vital Signs








  Date Time  Temp Pulse Resp B/P (MAP) Pulse Ox O2 Delivery O2 Flow Rate FiO2


 


3/26/18 20:40 96.9 87 18 156/80 (105) 99 Room Air  








Orders





 Orders


Complete Blood Count With Diff (3/26/18 21:29)


Comprehensive Metabolic Panel (3/26/18 21:29)


Urinalysis - C+S If Indicated (3/26/18 21:29)


Ed Urine Pregnancytest Poc (3/26/18 21:29)


Psych Screen (3/26/18 21:29)


Lithium (Li) (3/26/18 21:29)


Diet Regular Basic (3/27/18 Breakfast)


Drug Screen, Random Urine (3/26/18 21:29)


Lamotrigine (Lamictal) (3/26/18 23:45)


Aripiprazole (Abilify) (3/26/18 23:45)


Lorazepam (Ativan) (3/26/18 23:45)


Olanzapine (Zyprexa) (3/26/18 23:45)


Lithium Carbonate (Lithium Carbonate) (3/27/18 00:45)


Ed Discharge Order (3/27/18 10:23)





Labs





Laboratory Tests








Test


  3/26/18


22:10 3/26/18


23:40


 


Urine Color LIGHT-YELLOW  


 


Urine Turbidity CLEAR  


 


Urine pH 6.0  


 


Urine Specific Gravity 1.003  


 


Urine Protein NEG mg/dL  


 


Urine Glucose (UA) NEG mg/dL  


 


Urine Ketones NEG mg/dL  


 


Urine Occult Blood NEG  


 


Urine Nitrite NEG  


 


Urine Bilirubin NEG  


 


Urine Urobilinogen


  LESS THAN 2.0


MG/DL 


 


 


Urine Leukocyte Esterase NEG  


 


Urine WBC


  LESS THAN 1


/hpf 


 


 


Urine Squamous Epithelial


Cells <1 /hpf 


  


 


 


Urine Bacteria RARE /hpf  


 


Microscopic Urinalysis Comment


  CULT NOT


INDICATED 


 


 


Urine Opiates Screen NEG  


 


Urine Barbiturates Screen NEG  


 


Urine Amphetamines Screen NEG  


 


Urine Benzodiazepines Screen NEG  


 


Urine Cocaine Screen NEG  


 


Urine Cannabinoids Screen NEG  


 


White Blood Count  13.6 TH/MM3 


 


Red Blood Count  4.72 MIL/MM3 


 


Hemoglobin  14.1 GM/DL 


 


Hematocrit  41.0 % 


 


Mean Corpuscular Volume  87.0 FL 


 


Mean Corpuscular Hemoglobin  30.0 PG 


 


Mean Corpuscular Hemoglobin


Concent 


  34.5 % 


 


 


Red Cell Distribution Width  13.5 % 


 


Platelet Count  365 TH/MM3 


 


Mean Platelet Volume  6.7 FL 


 


Neutrophils (%) (Auto)  71.2 % 


 


Lymphocytes (%) (Auto)  23.2 % 


 


Monocytes (%) (Auto)  4.6 % 


 


Eosinophils (%) (Auto)  0.5 % 


 


Basophils (%) (Auto)  0.5 % 


 


Neutrophils # (Auto)  9.7 TH/MM3 


 


Lymphocytes # (Auto)  3.2 TH/MM3 


 


Monocytes # (Auto)  0.6 TH/MM3 


 


Eosinophils # (Auto)  0.1 TH/MM3 


 


Basophils # (Auto)  0.1 TH/MM3 


 


CBC Comment  DIFF FINAL 


 


Differential Comment   


 


Blood Urea Nitrogen  10 MG/DL 


 


Creatinine  1.03 MG/DL 


 


Random Glucose  120 MG/DL 


 


Total Protein  7.4 GM/DL 


 


Albumin  4.0 GM/DL 


 


Calcium Level  9.1 MG/DL 


 


Alkaline Phosphatase  135 U/L 


 


Aspartate Amino Transf


(AST/SGOT) 


  13 U/L 


 


 


Alanine Aminotransferase


(ALT/SGPT) 


  19 U/L 


 


 


Total Bilirubin  0.5 MG/DL 


 


Sodium Level  143 MEQ/L 


 


Potassium Level  3.6 MEQ/L 


 


Chloride Level  110 MEQ/L 


 


Carbon Dioxide Level  24.0 MEQ/L 


 


Anion Gap  9 MEQ/L 


 


Estimat Glomerular Filtration


Rate 


  65 ML/MIN 


 


 


Lithium Level  0.4 MEQ/L 











MDM


Supervised Visit with NICANOR:  No


Narrative Course


Patient seen and examined by me at 10:20 AM, this is a 27-year-old female with 

a history of cerebral palsy and behavioral disorder presented from a group home

, her Baker act has been lifted by psychiatry is been deemed not a threat to 

herself or others and stable for discharge.  She has noted no medical 

complaints at this time.  Her chief concern is that her mom was too far away 

from her.  At this time she is stable from discharge facility, will touch base 

with case management for transportation back to her group home


Diagnosis





 Primary Impression:  


 Adjustment disorder


Disposition:  01 DISCHARGE HOME ((Group Home))


Condition:  Stable











Harmeet Taylor MD Mar 27, 2018 10:25

## 2018-04-13 ENCOUNTER — HOSPITAL ENCOUNTER (EMERGENCY)
Dept: HOSPITAL 17 - NEPE | Age: 28
Discharge: HOME | End: 2018-04-13
Payer: COMMERCIAL

## 2018-04-13 VITALS
DIASTOLIC BLOOD PRESSURE: 95 MMHG | TEMPERATURE: 98.3 F | HEART RATE: 91 BPM | SYSTOLIC BLOOD PRESSURE: 171 MMHG | RESPIRATION RATE: 16 BRPM | OXYGEN SATURATION: 98 %

## 2018-04-13 VITALS — DIASTOLIC BLOOD PRESSURE: 83 MMHG | SYSTOLIC BLOOD PRESSURE: 134 MMHG

## 2018-04-13 DIAGNOSIS — G80.9: ICD-10-CM

## 2018-04-13 DIAGNOSIS — R94.31: ICD-10-CM

## 2018-04-13 DIAGNOSIS — E87.0: Primary | ICD-10-CM

## 2018-04-13 DIAGNOSIS — F20.9: ICD-10-CM

## 2018-04-13 DIAGNOSIS — F41.9: ICD-10-CM

## 2018-04-13 DIAGNOSIS — R10.32: ICD-10-CM

## 2018-04-13 LAB
ALBUMIN SERPL-MCNC: 4.2 GM/DL (ref 3.4–5)
ALP SERPL-CCNC: 130 U/L (ref 45–117)
ALT SERPL-CCNC: 19 U/L (ref 10–53)
AST SERPL-CCNC: 12 U/L (ref 15–37)
BACTERIA #/AREA URNS HPF: (no result) /HPF
BASOPHILS # BLD AUTO: 0.1 TH/MM3 (ref 0–0.2)
BASOPHILS NFR BLD: 0.6 % (ref 0–2)
BILIRUB SERPL-MCNC: 0.4 MG/DL (ref 0.2–1)
BUN SERPL-MCNC: 9 MG/DL (ref 7–18)
CALCIUM SERPL-MCNC: 9.5 MG/DL (ref 8.5–10.1)
CHLORIDE SERPL-SCNC: 109 MEQ/L (ref 98–107)
COLOR UR: COLORLESS
CREAT SERPL-MCNC: 1.07 MG/DL (ref 0.5–1)
EOSINOPHIL # BLD: 0.2 TH/MM3 (ref 0–0.4)
EOSINOPHIL NFR BLD: 1.5 % (ref 0–4)
ERYTHROCYTE [DISTWIDTH] IN BLOOD BY AUTOMATED COUNT: 13.7 % (ref 11.6–17.2)
GFR SERPLBLD BASED ON 1.73 SQ M-ARVRAT: 62 ML/MIN (ref 89–?)
GLUCOSE SERPL-MCNC: 89 MG/DL (ref 74–106)
GLUCOSE UR STRIP-MCNC: (no result) MG/DL
HCO3 BLD-SCNC: 28.5 MEQ/L (ref 21–32)
HCT VFR BLD CALC: 41 % (ref 35–46)
HGB BLD-MCNC: 13.9 GM/DL (ref 11.6–15.3)
HGB UR QL STRIP: (no result)
INR PPP: 1.1 RATIO
KETONES UR STRIP-MCNC: (no result) MG/DL
LYMPHOCYTES # BLD AUTO: 2.6 TH/MM3 (ref 1–4.8)
LYMPHOCYTES NFR BLD AUTO: 24.7 % (ref 9–44)
MCH RBC QN AUTO: 30 PG (ref 27–34)
MCHC RBC AUTO-ENTMCNC: 33.8 % (ref 32–36)
MCV RBC AUTO: 88.8 FL (ref 80–100)
MONOCYTE #: 0.6 TH/MM3 (ref 0–0.9)
MONOCYTES NFR BLD: 5.6 % (ref 0–8)
NEUTROPHILS # BLD AUTO: 7 TH/MM3 (ref 1.8–7.7)
NEUTROPHILS NFR BLD AUTO: 67.6 % (ref 16–70)
NITRITE UR QL STRIP: (no result)
PLATELET # BLD: 329 TH/MM3 (ref 150–450)
PMV BLD AUTO: 7.1 FL (ref 7–11)
PROT SERPL-MCNC: 7.4 GM/DL (ref 6.4–8.2)
PROTHROMBIN TIME: 10.8 SEC (ref 9.8–11.6)
RBC # BLD AUTO: 4.62 MIL/MM3 (ref 4–5.3)
SODIUM SERPL-SCNC: 144 MEQ/L (ref 136–145)
SP GR UR STRIP: 1 (ref 1–1.03)
URINE LEUKOCYTE ESTERASE: (no result)
WBC # BLD AUTO: 10.4 TH/MM3 (ref 4–11)

## 2018-04-13 PROCEDURE — 80053 COMPREHEN METABOLIC PANEL: CPT

## 2018-04-13 PROCEDURE — 85610 PROTHROMBIN TIME: CPT

## 2018-04-13 PROCEDURE — 85025 COMPLETE CBC W/AUTO DIFF WBC: CPT

## 2018-04-13 PROCEDURE — 81001 URINALYSIS AUTO W/SCOPE: CPT

## 2018-04-13 PROCEDURE — 93005 ELECTROCARDIOGRAM TRACING: CPT

## 2018-04-13 PROCEDURE — 85730 THROMBOPLASTIN TIME PARTIAL: CPT

## 2018-04-13 PROCEDURE — 99284 EMERGENCY DEPT VISIT MOD MDM: CPT

## 2018-04-13 NOTE — PD
HPI


Chief Complaint:  Abnormal Results


Time Seen by Provider:  17:03


Travel History


International Travel<30 days:  No


Contact w/Intl Traveler<30days:  No


Traveled to known affect area:  No





History of Present Illness


HPI


27-year-old female with PMH of MR presents to the ED from her group home for 

evaluation of hypernatremia.  Patient advocate at bedside states that lab work 

revealed sodium of 154 today.  On exam the patient endorses cramping left lower 

abdominal pain with bowel movements but has no other physical complaints.





PFSH


Past Medical History


Anxiety:  Yes


Cerebral Palsy:  Yes


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


Developmental Delay:  Yes


Diminished Hearing:  No


Endocrine:  No


Gastrointestinal Disorders:  No


Genitourinary:  No


Headaches:  No


Immune Disorder:  No


Musculoskeletal:  No


Neurologic:  Yes (right hand contracture)


Psychiatric:  Yes (Hx of treatment for Schizoaffective Disorder)


Reproductive:  No


Respiratory:  No


Immunizations Current:  Yes


Migraines:  No


Pancreatitis:  Yes


Schizophrenia:  Yes (HEARING HALLUCINATION SINCE 10 YEARS OLD )


Seizures:  Yes (see EMR)


Pregnant?:  Unknown





Past Surgical History


Cholecystectomy:  Yes


Thoracic Surgery:  Yes (G-TUBE INSERTION AND REMOVAL)


Other Surgery:  Yes





Social History


Alcohol Use:  No


Tobacco Use:  No


Substance Use:  No





Allergies-Medications


(Allergen,Severity, Reaction):  


Coded Allergies:  


     Fish Containing Products (Unverified  Allergy, Severe, Anaphylaxis, 4/13/18

)


     shrimp (Verified  Allergy, Intermediate, 4/13/18)


Reported Meds & Prescriptions





Reported Meds & Active Scripts


Active


Zyprexa Zydis (Olanzapine) 20 Mg Tab 20 Mg SL HS


Lamictal (Lamotrigine) 200 Mg Tab 200 Mg PO BID


Levothyroxine (Levothyroxine Sodium) 88 Mcg Tab 88 Mcg PO DAILY


Polyethylene Glycol 3350 (Polyethylene Glycol 3350 (Bulk) 1 Gra Gra 3,350 Meq 

PO DAILY 30 Days


Reported


Lorazepam 0.5 Mg Tab 0.5 Mg PO TID PRN


Ibuprofen 400 Mg Tab 400 Mg PO Q4H PRN


Milk of Magnesia Liq (Magnesium Hydroxide) 400 Mg/5 Ml Susp 30 Ml PO DAILY PRN


Tylenol (Acetaminophen) 325 Mg Tab 650 Mg PO Q6HR PRN


Loperamide (Loperamide HCl) 2 Mg Cap 2 Mg PO AS DIRECTED PRN


     One capsule after each loose stool.


     Not to exceed 8 capsules per day.


Clindamycin Topical (Clindamycin Phosphate) 1% Soln 1 Applic TOPICAL BID


Omeprazole 20 Mg Tab 20 Mg PO DAILY


Aripiprazole 2 Mg Tab 2 Mg PO HS


Docusate Sodium 100 Mg Cap 200 Mg PO DAILY


Lithium Carbonate 600 Mg Cap 600 Mg PO HS


Lithium Carbonate 300 Mg Cap 300 Mg PO DAILY


Depo-Provera Inj (Medroxyprogesterone Inj) 150 Mg/Ml Inj 150 Mg IM Q90D


D 1000 (Cholecalciferol) 1,000 Unit Cap 5,000 Unit PO DAILY








Review of Systems


Except as stated in HPI:  all other systems reviewed are Neg





Physical Exam


Narrative


GENERAL: Well-nourished, MR white female in no acute distress.


SKIN: Focused skin assessment warm/dry.


HEAD: Normocephalic.  Atraumatic.


EYES: No scleral icterus. No injection or drainage.  PERRLA.  EOMI.


NECK: Supple, trachea midline. No JVD or lymphadenopathy.


CARDIOVASCULAR: Regular rate and rhythm without murmurs, gallops, or rubs. 


RESPIRATORY: Breath sounds clear and equal bilaterally. No accessory muscle use.


GASTROINTESTINAL: Abdomen soft, non-tender, nondistended.  No palpable masses.  

Active bowel sounds.


MUSCULOSKELETAL: No cyanosis, or edema.  Muscle wasting and contracture of the 

right upper extremity.


NEUROLOGICAL: Awake and alert. Cranial nerves II through XII intact.  Motor and 

sensory grossly within normal  limits. Five out of 5 muscle strength in all 

muscle groups.  Normal speech.


BACK: Nontender without obvious deformity. No CVA tenderness.





Data


Data


Last Documented VS





Vital Signs








  Date Time  Temp Pulse Resp B/P (MAP) Pulse Ox O2 Delivery O2 Flow Rate FiO2


 


4/13/18 19:30  88 14 134/83 (100) 96   


 


4/13/18 16:56 98.3       








Orders





 Orders


Complete Blood Count With Diff (4/13/18 17:17)


Comprehensive Metabolic Panel (4/13/18 17:17)


Prothrombin Time / Inr (Pt) (4/13/18 17:17)


Act Partial Throm Time (Ptt) (4/13/18 17:17)


Urinalysis - C+S If Indicated (4/13/18 17:17)


Iv Access Insert/Monitor (4/13/18 17:17)


Ecg Monitoring (4/13/18 17:17)


Oximetry (4/13/18 17:17)


Sodium Chloride 0.9% Flush (Ns Flush) (4/13/18 17:30)


Electrocardiogram (4/13/18 17:17)


Ed Discharge Order (4/13/18 19:03)





Labs





Laboratory Tests








Test


  4/13/18


17:30 4/13/18


17:36


 


White Blood Count 10.4 TH/MM3  


 


Red Blood Count 4.62 MIL/MM3  


 


Hemoglobin 13.9 GM/DL  


 


Hematocrit 41.0 %  


 


Mean Corpuscular Volume 88.8 FL  


 


Mean Corpuscular Hemoglobin 30.0 PG  


 


Mean Corpuscular Hemoglobin


Concent 33.8 % 


  


 


 


Red Cell Distribution Width 13.7 %  


 


Platelet Count 329 TH/MM3  


 


Mean Platelet Volume 7.1 FL  


 


Neutrophils (%) (Auto) 67.6 %  


 


Lymphocytes (%) (Auto) 24.7 %  


 


Monocytes (%) (Auto) 5.6 %  


 


Eosinophils (%) (Auto) 1.5 %  


 


Basophils (%) (Auto) 0.6 %  


 


Neutrophils # (Auto) 7.0 TH/MM3  


 


Lymphocytes # (Auto) 2.6 TH/MM3  


 


Monocytes # (Auto) 0.6 TH/MM3  


 


Eosinophils # (Auto) 0.2 TH/MM3  


 


Basophils # (Auto) 0.1 TH/MM3  


 


CBC Comment DIFF FINAL  


 


Differential Comment   


 


Prothrombin Time 10.8 SEC  


 


Prothromb Time International


Ratio 1.1 RATIO 


  


 


 


Activated Partial


Thromboplast Time 23.9 SEC 


  


 


 


Blood Urea Nitrogen 9 MG/DL  


 


Creatinine 1.07 MG/DL  


 


Random Glucose 89 MG/DL  


 


Total Protein 7.4 GM/DL  


 


Albumin 4.2 GM/DL  


 


Calcium Level 9.5 MG/DL  


 


Alkaline Phosphatase 130 U/L  


 


Aspartate Amino Transf


(AST/SGOT) 12 U/L 


  


 


 


Alanine Aminotransferase


(ALT/SGPT) 19 U/L 


  


 


 


Total Bilirubin 0.4 MG/DL  


 


Sodium Level 144 MEQ/L  


 


Potassium Level 3.5 MEQ/L  


 


Chloride Level 109 MEQ/L  


 


Carbon Dioxide Level 28.5 MEQ/L  


 


Anion Gap 7 MEQ/L  


 


Estimat Glomerular Filtration


Rate 62 ML/MIN 


  


 


 


Urine Color  COLORLESS 


 


Urine Turbidity  CLEAR 


 


Urine pH  7.0 


 


Urine Specific Gravity  1.003 


 


Urine Protein  NEG mg/dL 


 


Urine Glucose (UA)  NEG mg/dL 


 


Urine Ketones  NEG mg/dL 


 


Urine Occult Blood  NEG 


 


Urine Nitrite  NEG 


 


Urine Bilirubin  NEG 


 


Urine Urobilinogen


  


  LESS THAN 2.0


MG/DL


 


Urine Leukocyte Esterase  NEG 


 


Urine WBC


  


  LESS THAN 1


/hpf


 


Urine Bacteria  OCC /hpf 


 


Microscopic Urinalysis Comment


  


  CULT NOT


INDICATED











MDM


Medical Decision Making


Medical Screen Exam Complete:  Yes


Emergency Medical Condition:  Yes


Differential Diagnosis


Metabolic derangement versus abnormal lab versus hyponatremia versus other


Narrative Course


27-year-old female with PMH of MR presents to the ED from her group home for 

evaluation of hypernatremia.  Patient advocate at bedside states that lab work 

revealed sodium of 154 today.  On exam the patient endorses cramping left lower 

abdominal pain with bowel movements but has no other physical complaints.  

Vitals reviewed.  Patient is hypertensive in triage this resolves in the exam 

room.  Exam is reassuring.  Sodium 144 on recheck.  No concerning abnormalities 

of the CBC, CMP, coags or urine.  The patient is stable, medically clear, 

discharged back to her skilled nursing facility.





Diagnosis





 Primary Impression:  


 Encounter for medical assessment


Referrals:  


Primary Care Physician





***Additional Instructions:  


Rest, hydrate.


Return to normal, gentle activity as tolerated.


Resume at home medications as previously prescribed.


Follow-up with the primary care provider


Return to the ED for any urgent or emergent medical condition.


Disposition:  01 DISCHARGE HOME


Condition:  Stable











Janine Leahy Apr 13, 2018 19:02

## 2018-04-14 NOTE — EKG
Date Performed: 2018       Time Performed: 17:34:20

 

PTAGE:      27 years

 

EKG:      Sinus rhythm 

 

 NONSPECIFIC ST & T-WAVE ABNORMALITY BORDERLINE ECG Compared to 

 

 PREVIOUS TRACING            , patient is no longer tachycardic. PREVIOUS TRACIN2018 11.25

 

DOCTOR:   Rhonda Proctor  Interpretating Date/Time  2018 16:57:43

## 2018-04-18 ENCOUNTER — HOSPITAL ENCOUNTER (EMERGENCY)
Dept: HOSPITAL 17 - NEPC | Age: 28
Discharge: HOME | End: 2018-04-18
Payer: COMMERCIAL

## 2018-04-18 VITALS — WEIGHT: 132.28 LBS | BODY MASS INDEX: 21.26 KG/M2 | HEIGHT: 66 IN

## 2018-04-18 VITALS
DIASTOLIC BLOOD PRESSURE: 91 MMHG | RESPIRATION RATE: 17 BRPM | TEMPERATURE: 98.1 F | HEART RATE: 100 BPM | OXYGEN SATURATION: 100 % | SYSTOLIC BLOOD PRESSURE: 147 MMHG

## 2018-04-18 DIAGNOSIS — F41.9: ICD-10-CM

## 2018-04-18 DIAGNOSIS — R42: Primary | ICD-10-CM

## 2018-04-18 DIAGNOSIS — F20.9: ICD-10-CM

## 2018-04-18 DIAGNOSIS — G80.9: ICD-10-CM

## 2018-04-18 DIAGNOSIS — Z86.73: ICD-10-CM

## 2018-04-18 LAB
ALBUMIN SERPL-MCNC: 4.3 GM/DL (ref 3.4–5)
ALP SERPL-CCNC: 141 U/L (ref 45–117)
ALT SERPL-CCNC: 20 U/L (ref 10–53)
AST SERPL-CCNC: 11 U/L (ref 15–37)
BACTERIA #/AREA URNS HPF: (no result) /HPF
BASOPHILS # BLD AUTO: 0.1 TH/MM3 (ref 0–0.2)
BASOPHILS NFR BLD: 0.6 % (ref 0–2)
BILIRUB SERPL-MCNC: 0.4 MG/DL (ref 0.2–1)
BUN SERPL-MCNC: 9 MG/DL (ref 7–18)
CALCIUM SERPL-MCNC: 9.6 MG/DL (ref 8.5–10.1)
CHLORIDE SERPL-SCNC: 109 MEQ/L (ref 98–107)
COLOR UR: COLORLESS
CREAT SERPL-MCNC: 0.93 MG/DL (ref 0.5–1)
EOSINOPHIL # BLD: 0.1 TH/MM3 (ref 0–0.4)
EOSINOPHIL NFR BLD: 0.7 % (ref 0–4)
ERYTHROCYTE [DISTWIDTH] IN BLOOD BY AUTOMATED COUNT: 13.6 % (ref 11.6–17.2)
GFR SERPLBLD BASED ON 1.73 SQ M-ARVRAT: 73 ML/MIN (ref 89–?)
GLUCOSE SERPL-MCNC: 86 MG/DL (ref 74–106)
GLUCOSE UR STRIP-MCNC: (no result) MG/DL
HCO3 BLD-SCNC: 29.2 MEQ/L (ref 21–32)
HCT VFR BLD CALC: 42.7 % (ref 35–46)
HGB BLD-MCNC: 14.8 GM/DL (ref 11.6–15.3)
HGB UR QL STRIP: (no result)
KETONES UR STRIP-MCNC: (no result) MG/DL
LYMPHOCYTES # BLD AUTO: 1.7 TH/MM3 (ref 1–4.8)
LYMPHOCYTES NFR BLD AUTO: 17.5 % (ref 9–44)
MCH RBC QN AUTO: 30.5 PG (ref 27–34)
MCHC RBC AUTO-ENTMCNC: 34.8 % (ref 32–36)
MCV RBC AUTO: 87.7 FL (ref 80–100)
MONOCYTE #: 0.5 TH/MM3 (ref 0–0.9)
MONOCYTES NFR BLD: 4.9 % (ref 0–8)
NEUTROPHILS # BLD AUTO: 7.3 TH/MM3 (ref 1.8–7.7)
NEUTROPHILS NFR BLD AUTO: 76.3 % (ref 16–70)
NITRITE UR QL STRIP: (no result)
PLATELET # BLD: 354 TH/MM3 (ref 150–450)
PMV BLD AUTO: 6.8 FL (ref 7–11)
PROT SERPL-MCNC: 7.6 GM/DL (ref 6.4–8.2)
RBC # BLD AUTO: 4.86 MIL/MM3 (ref 4–5.3)
SODIUM SERPL-SCNC: 145 MEQ/L (ref 136–145)
SP GR UR STRIP: 1 (ref 1–1.03)
SQUAMOUS #/AREA URNS HPF: <1 /HPF (ref 0–5)
URINE LEUKOCYTE ESTERASE: (no result)
WBC # BLD AUTO: 9.6 TH/MM3 (ref 4–11)

## 2018-04-18 PROCEDURE — 80178 ASSAY OF LITHIUM: CPT

## 2018-04-18 PROCEDURE — 80053 COMPREHEN METABOLIC PANEL: CPT

## 2018-04-18 PROCEDURE — 85025 COMPLETE CBC W/AUTO DIFF WBC: CPT

## 2018-04-18 PROCEDURE — 81001 URINALYSIS AUTO W/SCOPE: CPT

## 2018-04-18 PROCEDURE — 99283 EMERGENCY DEPT VISIT LOW MDM: CPT

## 2018-04-18 PROCEDURE — 84703 CHORIONIC GONADOTROPIN ASSAY: CPT

## 2018-04-18 NOTE — PD
HPI


Chief Complaint:  Abnormal Results


Time Seen by Provider:  13:40


Travel History


International Travel<30 days:  No


Contact w/Intl Traveler<30days:  No


Traveled to known affect area:  No





History of Present Illness


HPI


27 year old female presents to the emergency department for evaluation of 

hypernatremia.  Patient was sent by her psychiatrist for hypernatremia 150.  

She is here with the group [place] coordinator where she lives.  Coordinator 

states that her lithium was discontinued 2 days ago due to the hypernatremia.  

She states that her other physicians are out of town so that is why she was 

referred to the emergency department.  She states the patient does drink plenty 

of fluids throughout the day.  The patient complains of being dizzy and has a 

headache.  According to the [facility] coordinator, this is a chronic complaint 

for her and not new.  No chest pain or shortness breath.  No abdominal pain.  

No nausea, vomiting, diarrhea.  No other symptoms or complaints.  Moderate 

severity.





PFSH


Past Medical History


Anxiety:  Yes


Cerebral Palsy:  Yes


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


Developmental Delay:  Yes


Diminished Hearing:  No


Endocrine:  No


Gastrointestinal Disorders:  No


Genitourinary:  No


Headaches:  Yes


Immune Disorder:  No


Musculoskeletal:  No


Neurologic:  Yes (right hand contracture)


Psychiatric:  Yes (Hx of treatment for Schizoaffective Disorder)


Reproductive:  No


Respiratory:  No


Immunizations Current:  Yes


Migraines:  No


Pancreatitis:  Yes


Schizophrenia:  Yes (HEARING HALLUCINATION SINCE 10 YEARS OLD )


Seizures:  Yes (see EMR)


Tetanus Vaccination:  Unknown


Influenza Vaccination:  Yes


Pregnant?:  Unknown





Past Surgical History


Cholecystectomy:  Yes


Thoracic Surgery:  Yes (G-TUBE INSERTION AND REMOVAL)


Other Surgery:  Yes





Social History


Alcohol Use:  No


Tobacco Use:  No


Substance Use:  No





Allergies-Medications


(Allergen,Severity, Reaction):  


Coded Allergies:  


     Fish Containing Products (Unverified  Allergy, Severe, Anaphylaxis, 4/18/18

)


     shrimp (Verified  Allergy, Intermediate, 4/18/18)


Reported Meds & Prescriptions





Reported Meds & Active Scripts


Active


Zyprexa Zydis (Olanzapine) 20 Mg Tab 20 Mg SL HS


Lamictal (Lamotrigine) 200 Mg Tab 200 Mg PO BID


Levothyroxine (Levothyroxine Sodium) 88 Mcg Tab 88 Mcg PO DAILY


Reported


Vitamin D-1000 Maximum St (Cholecalciferol) 1,000 Unit Tab 5,000 Units PO DAILY


Dentagel (Sodium Fluoride (Dental)) 1.1 % Gel 1.1 %  BID


Lorazepam 0.5 Mg Tab 0.5 Mg PO TID PRN


Ibuprofen 400 Mg Tab 400 Mg PO Q4H PRN


Milk of Magnesia Liq (Magnesium Hydroxide) 400 Mg/5 Ml Susp 30 Ml PO DAILY PRN


Tylenol (Acetaminophen) 325 Mg Tab 650 Mg PO Q6HR PRN


Loperamide (Loperamide HCl) 2 Mg Cap 2 Mg PO AS DIRECTED PRN


     One capsule after each loose stool.


     Not to exceed 8 capsules per day.


Clindamycin Topical (Clindamycin Phosphate) 1% Soln 1 Applic TOPICAL BID


Omeprazole 20 Mg Tab 20 Mg PO DAILY


Aripiprazole 2 Mg Tab 2 Mg PO HS


Docusate Sodium 100 Mg Cap 200 Mg PO DAILY


Depo-Provera Inj (Medroxyprogesterone Inj) 150 Mg/Ml Inj 150 Mg IM Q90D








Review of Systems


Except as stated in HPI:  all other systems reviewed are Neg





Physical Exam


Narrative


GENERAL: Well-nourished, well-developed female patient, ambulatory.  Afebrile.


SKIN: Focused skin assessment warm/dry.


HEAD: Normocephalic.  Atraumatic


EYES: No scleral icterus. No injection or drainage. 


NECK: Supple, trachea midline. No JVD or lymphadenopathy.


CARDIOVASCULAR: Regular rate and rhythm without murmurs, gallops, or rubs. 


RESPIRATORY: Breath sounds equal bilaterally. No accessory muscle use.  Lungs 

sounds are clear to auscultation.


GASTROINTESTINAL: Abdomen soft, non-tender, nondistended. 


MUSCULOSKELETAL: No cyanosis, or edema. 


BACK: Nontender without obvious deformity. No CVA tenderness.





Data


Data


Last Documented VS





Vital Signs








  Date Time  Temp Pulse Resp B/P (MAP) Pulse Ox O2 Delivery O2 Flow Rate FiO2


 


4/18/18 13:26 98.1 100 17 147/91 (109) 100   








Orders





 Orders


Complete Blood Count With Diff (4/18/18 13:46)


Comprehensive Metabolic Panel (4/18/18 13:46)


Urinalysis - C+S If Indicated (4/18/18 13:46)


Lithium (Li) (4/18/18 13:46)


Ed Urine Pregnancytest Poc (4/18/18 13:55)





Labs





Laboratory Tests








Test


  4/18/18


14:10 4/18/18


14:16


 


Urine Color COLORLESS  


 


Urine Turbidity CLEAR  


 


Urine pH 6.0  


 


Urine Specific Gravity 1.002  


 


Urine Protein NEG mg/dL  


 


Urine Glucose (UA) NEG mg/dL  


 


Urine Ketones NEG mg/dL  


 


Urine Occult Blood NEG  


 


Urine Nitrite NEG  


 


Urine Bilirubin NEG  


 


Urine Urobilinogen


  LESS THAN 2.0


MG/DL 


 


 


Urine Leukocyte Esterase NEG  


 


Urine RBC


  LESS THAN 1


/hpf 


 


 


Urine WBC


  LESS THAN 1


/hpf 


 


 


Urine Squamous Epithelial


Cells <1 /hpf 


  


 


 


Urine Bacteria FEW /hpf  


 


Microscopic Urinalysis Comment


  CULT NOT


INDICATED 


 


 


White Blood Count  9.6 TH/MM3 


 


Red Blood Count  4.86 MIL/MM3 


 


Hemoglobin  14.8 GM/DL 


 


Hematocrit  42.7 % 


 


Mean Corpuscular Volume  87.7 FL 


 


Mean Corpuscular Hemoglobin  30.5 PG 


 


Mean Corpuscular Hemoglobin


Concent 


  34.8 % 


 


 


Red Cell Distribution Width  13.6 % 


 


Platelet Count  354 TH/MM3 


 


Mean Platelet Volume  6.8 FL 


 


Neutrophils (%) (Auto)  76.3 % 


 


Lymphocytes (%) (Auto)  17.5 % 


 


Monocytes (%) (Auto)  4.9 % 


 


Eosinophils (%) (Auto)  0.7 % 


 


Basophils (%) (Auto)  0.6 % 


 


Neutrophils # (Auto)  7.3 TH/MM3 


 


Lymphocytes # (Auto)  1.7 TH/MM3 


 


Monocytes # (Auto)  0.5 TH/MM3 


 


Eosinophils # (Auto)  0.1 TH/MM3 


 


Basophils # (Auto)  0.1 TH/MM3 


 


CBC Comment  DIFF FINAL 


 


Differential Comment   


 


Blood Urea Nitrogen  9 MG/DL 


 


Creatinine  0.93 MG/DL 


 


Random Glucose  86 MG/DL 


 


Total Protein  7.6 GM/DL 


 


Albumin  4.3 GM/DL 


 


Calcium Level  9.6 MG/DL 


 


Alkaline Phosphatase  141 U/L 


 


Aspartate Amino Transf


(AST/SGOT) 


  11 U/L 


 


 


Alanine Aminotransferase


(ALT/SGPT) 


  20 U/L 


 


 


Total Bilirubin  0.4 MG/DL 


 


Sodium Level  145 MEQ/L 


 


Potassium Level  3.4 MEQ/L 


 


Chloride Level  109 MEQ/L 


 


Carbon Dioxide Level  29.2 MEQ/L 


 


Anion Gap  7 MEQ/L 


 


Estimat Glomerular Filtration


Rate 


  73 ML/MIN 


 


 


Nesbitt Level


  


  LESS THAN 0.1


MEQ/L











[facility]


Medical Decision Making


Medical Screen Exam Complete:  Yes


Emergency Medical Condition:  Yes


Medical Record Reviewed:  Yes


Differential Diagnosis


Electrolyte abnormality versus UTI vs. lithium toxicity


Narrative Course


27-year-old female presents to the emergency department for evaluation of 

hypernatremia.  CBC, CMP, UA, lithium level are ordered and pending.





CBC shows no acute abnormality.  CMP is normal sodium of 145.  Lithium level is 

less than 0.1.  UA is negative for acute infection.  Urine pregnancy test is 

negative.





Patient is stable for discharge as her sodium level is normal.  She is 

instructed to follow with her primary care physician.  Her  carny direct 

bedside verbalizes agreement.





Diagnosis





 Primary Impression:  


 Dizziness


Referrals:  


Primary Care Physician


call for appointment


Patient Instructions:  Dizziness (ED), General Instructions





***Additional Instructions:  


Follow-up with your primary care physician.


Return to the emergency department for any acute worsening of symptoms.


***Med/Other Pt SpecificInfo:  No Change to Meds


Disposition:  01 DISCHARGE HOME


Condition:  Stable











Freida Lux MAYDA Apr 18, 2018 13:54